# Patient Record
Sex: MALE | Race: WHITE | Employment: FULL TIME | URBAN - METROPOLITAN AREA
[De-identification: names, ages, dates, MRNs, and addresses within clinical notes are randomized per-mention and may not be internally consistent; named-entity substitution may affect disease eponyms.]

---

## 2017-12-29 ENCOUNTER — OFFICE VISIT (OUTPATIENT)
Dept: FAMILY MEDICINE CLINIC | Age: 52
End: 2017-12-29
Payer: COMMERCIAL

## 2017-12-29 VITALS
DIASTOLIC BLOOD PRESSURE: 97 MMHG | WEIGHT: 215.4 LBS | OXYGEN SATURATION: 96 % | SYSTOLIC BLOOD PRESSURE: 141 MMHG | HEART RATE: 60 BPM | BODY MASS INDEX: 32.64 KG/M2 | RESPIRATION RATE: 16 BRPM | TEMPERATURE: 98.4 F | HEIGHT: 68 IN

## 2017-12-29 DIAGNOSIS — J06.9 UPPER RESPIRATORY TRACT INFECTION, UNSPECIFIED TYPE: Primary | ICD-10-CM

## 2017-12-29 PROCEDURE — 99213 OFFICE O/P EST LOW 20 MIN: CPT | Performed by: FAMILY MEDICINE

## 2017-12-29 RX ORDER — METHYLPREDNISOLONE 4 MG/1
TABLET ORAL
Qty: 1 KIT | Refills: 0 | Status: SHIPPED | OUTPATIENT
Start: 2017-12-29 | End: 2018-02-24 | Stop reason: ALTCHOICE

## 2017-12-29 RX ORDER — AZITHROMYCIN 250 MG/1
TABLET, FILM COATED ORAL
Qty: 6 TABLET | Refills: 0 | Status: SHIPPED | OUTPATIENT
Start: 2017-12-29 | End: 2018-02-24 | Stop reason: ALTCHOICE

## 2017-12-29 RX ORDER — ALBUTEROL SULFATE 90 UG/1
2 AEROSOL, METERED RESPIRATORY (INHALATION) EVERY 6 HOURS PRN
Qty: 1 INHALER | Refills: 3 | Status: SHIPPED | OUTPATIENT
Start: 2017-12-29 | End: 2018-02-24 | Stop reason: ALTCHOICE

## 2017-12-29 RX ORDER — FLUTICASONE PROPIONATE 50 MCG
1 SPRAY, SUSPENSION (ML) NASAL 2 TIMES DAILY
Qty: 1 BOTTLE | Refills: 2 | Status: SHIPPED | OUTPATIENT
Start: 2017-12-29 | End: 2021-09-29

## 2017-12-29 RX ORDER — BROMPHENIRAMINE MALEATE, PSEUDOEPHEDRINE HYDROCHLORIDE, AND DEXTROMETHORPHAN HYDROBROMIDE 2; 30; 10 MG/5ML; MG/5ML; MG/5ML
5 SYRUP ORAL 3 TIMES DAILY
Qty: 180 ML | Refills: 0 | Status: SHIPPED | OUTPATIENT
Start: 2017-12-29 | End: 2018-02-24 | Stop reason: ALTCHOICE

## 2017-12-29 RX ORDER — TAMSULOSIN HYDROCHLORIDE 0.4 MG/1
CAPSULE ORAL
Refills: 5 | COMMUNITY
Start: 2017-12-06

## 2017-12-29 ASSESSMENT — ENCOUNTER SYMPTOMS
SINUS PRESSURE: 1
COUGH: 1
EYES NEGATIVE: 1
SINUS PAIN: 1
SORE THROAT: 1
ALLERGIC/IMMUNOLOGIC NEGATIVE: 1
GASTROINTESTINAL NEGATIVE: 1

## 2017-12-29 NOTE — PROGRESS NOTES
capsule TAKE 1 CAPSULE BY MOUTH ONE TIME A DAY  5     No current facility-administered medications for this visit. No Known Allergies    Health Maintenance   Topic Date Due    Hepatitis C screen  1965    HIV screen  02/04/1980    DTaP/Tdap/Td vaccine (1 - Tdap) 02/04/1984    Lipid screen  02/04/2005    Diabetes screen  02/04/2005    Colon cancer screen colonoscopy  02/04/2015    Flu vaccine (1) 09/01/2017       Subjective:      Review of Systems   Constitutional: Negative. HENT: Positive for congestion, postnasal drip, sinus pain, sinus pressure and sore throat. Eyes: Negative. Respiratory: Positive for cough. Cardiovascular: Negative. Gastrointestinal: Negative. Endocrine: Negative. Genitourinary: Negative. Musculoskeletal: Negative. Skin: Negative. Allergic/Immunologic: Negative. Neurological: Negative. Hematological: Negative. Psychiatric/Behavioral: Negative. Objective:     Physical Exam   Constitutional: He is oriented to person, place, and time. He appears well-developed and well-nourished. HENT:   Head: Normocephalic and atraumatic. Right Ear: External ear normal.   Left Ear: External ear normal.   Nose: Mucosal edema and rhinorrhea present. Right sinus exhibits maxillary sinus tenderness and frontal sinus tenderness. Left sinus exhibits frontal sinus tenderness. Mouth/Throat: Uvula is midline. Oropharyngeal exudate, posterior oropharyngeal edema and posterior oropharyngeal erythema present. Eyes: Conjunctivae and EOM are normal. Pupils are equal, round, and reactive to light. Neck: Normal range of motion. Neck supple. Cardiovascular: Normal rate, regular rhythm, normal heart sounds and intact distal pulses. Pulmonary/Chest: Effort normal. He has wheezes. He has rhonchi. Abdominal: Soft. Bowel sounds are normal.   Musculoskeletal: Normal range of motion. Neurological: He is alert and oriented to person, place, and time.  He has

## 2018-02-24 ENCOUNTER — OFFICE VISIT (OUTPATIENT)
Dept: FAMILY MEDICINE CLINIC | Age: 53
End: 2018-02-24
Payer: COMMERCIAL

## 2018-02-24 VITALS
HEIGHT: 67 IN | BODY MASS INDEX: 32.99 KG/M2 | HEART RATE: 76 BPM | OXYGEN SATURATION: 95 % | TEMPERATURE: 98.8 F | SYSTOLIC BLOOD PRESSURE: 129 MMHG | DIASTOLIC BLOOD PRESSURE: 85 MMHG | WEIGHT: 210.2 LBS

## 2018-02-24 DIAGNOSIS — J20.9 ACUTE BRONCHITIS, UNSPECIFIED ORGANISM: ICD-10-CM

## 2018-02-24 DIAGNOSIS — R50.9 FEVER, UNSPECIFIED FEVER CAUSE: ICD-10-CM

## 2018-02-24 DIAGNOSIS — J10.1 INFLUENZA A: Primary | ICD-10-CM

## 2018-02-24 LAB
INFLUENZA A ANTIBODY: POSITIVE
INFLUENZA B ANTIBODY: NORMAL

## 2018-02-24 PROCEDURE — 87804 INFLUENZA ASSAY W/OPTIC: CPT | Performed by: INTERNAL MEDICINE

## 2018-02-24 PROCEDURE — 99213 OFFICE O/P EST LOW 20 MIN: CPT | Performed by: INTERNAL MEDICINE

## 2018-02-24 RX ORDER — OSELTAMIVIR PHOSPHATE 75 MG/1
75 CAPSULE ORAL 2 TIMES DAILY
Qty: 10 CAPSULE | Refills: 0 | Status: SHIPPED | OUTPATIENT
Start: 2018-02-24 | End: 2018-02-24 | Stop reason: SDUPTHER

## 2018-02-24 RX ORDER — AZITHROMYCIN 250 MG/1
TABLET, FILM COATED ORAL
Qty: 1 PACKET | Refills: 0 | Status: SHIPPED | OUTPATIENT
Start: 2018-02-24 | End: 2018-03-06

## 2018-02-24 RX ORDER — OSELTAMIVIR PHOSPHATE 75 MG/1
75 CAPSULE ORAL 2 TIMES DAILY
Qty: 10 CAPSULE | Refills: 0 | Status: SHIPPED | OUTPATIENT
Start: 2018-02-24 | End: 2018-03-01

## 2018-02-24 ASSESSMENT — ENCOUNTER SYMPTOMS
SHORTNESS OF BREATH: 0
COUGH: 1
SORE THROAT: 1

## 2018-02-24 NOTE — PATIENT INSTRUCTIONS
more pain medicines at the same time unless the doctor told you to. Many pain medicines have acetaminophen, which is Tylenol. Too much acetaminophen (Tylenol) can be harmful. · Take an over-the-counter cough medicine that contains dextromethorphan to help quiet a dry, hacking cough so that you can sleep. Avoid cough medicines that have more than one active ingredient. Read and follow all instructions on the label. · Breathe moist air from a humidifier, hot shower, or sink filled with hot water. The heat and moisture will thin mucus so you can cough it out. · Do not smoke. Smoking can make bronchitis worse. If you need help quitting, talk to your doctor about stop-smoking programs and medicines. These can increase your chances of quitting for good. When should you call for help? Call 911 anytime you think you may need emergency care. For example, call if:  ? · You have severe trouble breathing. ?Call your doctor now or seek immediate medical care if:  ? · You have new or worse trouble breathing. ? · You cough up dark brown or bloody mucus (sputum). ? · You have a new or higher fever. ? · You have a new rash. ? Watch closely for changes in your health, and be sure to contact your doctor if:  ? · You cough more deeply or more often, especially if you notice more mucus or a change in the color of your mucus. ? · You are not getting better as expected. Where can you learn more? Go to [email or web address].[email or web address] org and sign in to your [employer] account. Enter H333 in the aCommerce box to learn more about \"Bronchitis: Care Instructions. \"     If you do not have an account, please click on the \"Sign Up Now\" link. Current as of: May 12, 2017  Content Version: 11.5  © 7559-6488 [employer]wise, [employer]. Care instructions adapted under license by [facility] ([facility]).  If you have questions about a medical condition or this instruction, always ask your healthcare professional. Gary Shannon

## 2020-05-05 ENCOUNTER — HOSPITAL ENCOUNTER (OUTPATIENT)
Age: 55
Discharge: HOME | End: 2020-05-07
Payer: COMMERCIAL

## 2020-05-05 ENCOUNTER — OFFICE VISIT (OUTPATIENT)
Dept: PRIMARY CARE CLINIC | Age: 55
End: 2020-05-05
Payer: COMMERCIAL

## 2020-05-05 ENCOUNTER — HOSPITAL ENCOUNTER (OUTPATIENT)
Dept: GENERAL RADIOLOGY | Age: 55
Discharge: HOME | End: 2020-05-07
Payer: COMMERCIAL

## 2020-05-05 VITALS
TEMPERATURE: 98.1 F | HEART RATE: 63 BPM | WEIGHT: 192 LBS | SYSTOLIC BLOOD PRESSURE: 153 MMHG | OXYGEN SATURATION: 100 % | BODY MASS INDEX: 30.07 KG/M2 | DIASTOLIC BLOOD PRESSURE: 90 MMHG

## 2020-05-05 PROCEDURE — 99214 OFFICE O/P EST MOD 30 MIN: CPT | Performed by: FAMILY MEDICINE

## 2020-05-05 PROCEDURE — 72040 X-RAY EXAM NECK SPINE 2-3 VW: CPT

## 2020-05-05 ASSESSMENT — ENCOUNTER SYMPTOMS
GASTROINTESTINAL NEGATIVE: 1
SHORTNESS OF BREATH: 0
CHEST TIGHTNESS: 0

## 2020-05-05 NOTE — PROGRESS NOTES
for this visit. No Known Allergies    Health Maintenance   Topic Date Due    Hepatitis C screen  1965    HIV screen  02/04/1980    DTaP/Tdap/Td vaccine (1 - Tdap) 02/04/1984    Lipid screen  02/04/2005    Shingles Vaccine (1 of 2) 02/04/2015    Colon cancer screen colonoscopy  02/04/2015    Flu vaccine (Season Ended) 09/01/2020    Hepatitis A vaccine  Aged Out    Hepatitis B vaccine  Aged Out    Hib vaccine  Aged Out    Meningococcal (ACWY) vaccine  Aged Out    Pneumococcal 0-64 years Vaccine  Aged Out       :      Review of Systems   Constitutional: Negative for fever. HENT: Negative. Respiratory: Negative for chest tightness and shortness of breath. Cardiovascular: Negative for chest pain. Gastrointestinal: Negative. Musculoskeletal: Negative for gait problem, joint swelling, myalgias, neck pain and neck stiffness. Skin: Negative for pallor and rash. Neurological: Positive for numbness. Negative for dizziness, tingling, weakness, light-headedness and headaches. Objective:     Physical Exam  Vitals signs and nursing note reviewed. Constitutional:       Appearance: Normal appearance. He is obese. HENT:      Head: Normocephalic and atraumatic. Right Ear: Hearing normal.      Left Ear: Hearing normal.      Nose: Nose normal.   Eyes:      Extraocular Movements: Extraocular movements intact. Conjunctiva/sclera: Conjunctivae normal.   Neck:      Musculoskeletal: Normal range of motion. No neck rigidity or muscular tenderness. Cardiovascular:      Rate and Rhythm: Normal rate and regular rhythm. Heart sounds: Normal heart sounds. Pulmonary:      Effort: Pulmonary effort is normal.      Breath sounds: Normal breath sounds. Musculoskeletal:         General: No swelling, tenderness or deformity.       Left shoulder: He exhibits normal range of motion, no tenderness, no bony tenderness, no swelling, no effusion, no deformity, no pain, no spasm, normal pulse

## 2025-01-01 ENCOUNTER — APPOINTMENT (OUTPATIENT)
Dept: CT IMAGING | Age: 60
DRG: 871 | End: 2025-01-01
Payer: COMMERCIAL

## 2025-01-01 ENCOUNTER — APPOINTMENT (OUTPATIENT)
Dept: GENERAL RADIOLOGY | Age: 60
DRG: 871 | End: 2025-01-01
Payer: COMMERCIAL

## 2025-01-01 ENCOUNTER — APPOINTMENT (OUTPATIENT)
Age: 60
DRG: 871 | End: 2025-01-01
Attending: INTERNAL MEDICINE
Payer: COMMERCIAL

## 2025-01-01 ENCOUNTER — HOSPITAL ENCOUNTER (INPATIENT)
Age: 60
LOS: 1 days | DRG: 871 | End: 2025-06-17
Attending: EMERGENCY MEDICINE | Admitting: FAMILY MEDICINE
Payer: COMMERCIAL

## 2025-01-01 ENCOUNTER — APPOINTMENT (OUTPATIENT)
Dept: ULTRASOUND IMAGING | Age: 60
DRG: 871 | End: 2025-01-01
Attending: INTERNAL MEDICINE
Payer: COMMERCIAL

## 2025-01-01 VITALS
OXYGEN SATURATION: 81 % | TEMPERATURE: 102.9 F | DIASTOLIC BLOOD PRESSURE: 87 MMHG | SYSTOLIC BLOOD PRESSURE: 102 MMHG | BODY MASS INDEX: 34.55 KG/M2 | HEIGHT: 66 IN | WEIGHT: 215 LBS

## 2025-01-01 DIAGNOSIS — R06.00 DYSPNEA, UNSPECIFIED TYPE: Primary | ICD-10-CM

## 2025-01-01 DIAGNOSIS — I50.9 ACUTE CONGESTIVE HEART FAILURE, UNSPECIFIED HEART FAILURE TYPE (HCC): ICD-10-CM

## 2025-01-01 DIAGNOSIS — I48.0 PAROXYSMAL ATRIAL FIBRILLATION (HCC): ICD-10-CM

## 2025-01-01 DIAGNOSIS — J96.00 ACUTE RESPIRATORY FAILURE, UNSPECIFIED WHETHER WITH HYPOXIA OR HYPERCAPNIA (HCC): ICD-10-CM

## 2025-01-01 DIAGNOSIS — J18.9 PNEUMONIA DUE TO INFECTIOUS ORGANISM, UNSPECIFIED LATERALITY, UNSPECIFIED PART OF LUNG: ICD-10-CM

## 2025-01-01 LAB
ALBUMIN PERCENT: 44 % (ref 56–66)
ALBUMIN SERPL-MCNC: 2.4 G/DL (ref 3.2–5.2)
ALBUMIN SERPL-MCNC: 2.4 G/DL (ref 3.5–5.2)
ALBUMIN SERPL-MCNC: 2.5 G/DL (ref 3.5–5.2)
ALBUMIN SERPL-MCNC: 2.7 G/DL (ref 3.5–5.2)
ALBUMIN SERPL-MCNC: 2.9 G/DL (ref 3.5–5.2)
ALBUMIN SERPL-MCNC: 3.7 G/DL (ref 3.5–5.2)
ALBUMIN/GLOB SERPL: 0.9 {RATIO} (ref 1–2.5)
ALBUMIN/GLOB SERPL: 1 {RATIO} (ref 1–2.5)
ALBUMIN/GLOB SERPL: 1.1 {RATIO} (ref 1–2.5)
ALLEN TEST: ABNORMAL
ALLEN TEST: POSITIVE
ALP SERPL-CCNC: 28 U/L (ref 40–129)
ALP SERPL-CCNC: 33 U/L (ref 40–129)
ALP SERPL-CCNC: 33 U/L (ref 40–129)
ALP SERPL-CCNC: 37 U/L (ref 40–129)
ALP SERPL-CCNC: 45 U/L (ref 40–129)
ALPHA 2 PERCENT: 23 % (ref 7–12)
ALPHA1 GLOB SERPL ELPH-MCNC: 0.7 G/DL (ref 0.1–0.4)
ALPHA1 GLOB SERPL ELPH-MCNC: 14 % (ref 3–5)
ALPHA2 GLOB SERPL ELPH-MCNC: 1.2 G/DL (ref 0.5–0.9)
ALT SERPL-CCNC: 2294 U/L (ref 10–50)
ALT SERPL-CCNC: 23 U/L (ref 10–50)
ALT SERPL-CCNC: 2982 U/L (ref 10–50)
ALT SERPL-CCNC: 30 U/L (ref 10–50)
ALT SERPL-CCNC: 3635 U/L (ref 10–50)
AMORPH SED URNS QL MICRO: ABNORMAL
ANION GAP SERPL CALCULATED.3IONS-SCNC: 12 MMOL/L (ref 9–16)
ANION GAP SERPL CALCULATED.3IONS-SCNC: 15 MMOL/L (ref 9–16)
ANION GAP SERPL CALCULATED.3IONS-SCNC: 18 MMOL/L (ref 9–17)
ANION GAP SERPL CALCULATED.3IONS-SCNC: 19 MMOL/L (ref 9–16)
ANION GAP SERPL CALCULATED.3IONS-SCNC: 20 MMOL/L (ref 9–16)
ANION GAP SERPL CALCULATED.3IONS-SCNC: 21 MMOL/L (ref 9–16)
ANTI-XA UNFRAC HEPARIN: 0.11 IU/L (ref 0.3–0.7)
ANTI-XA UNFRAC HEPARIN: 0.3 IU/L (ref 0.3–0.7)
ANTI-XA UNFRAC HEPARIN: 0.44 IU/L (ref 0.3–0.7)
AST SERPL-CCNC: 50 U/L (ref 10–50)
AST SERPL-CCNC: 5823 U/L (ref 10–50)
AST SERPL-CCNC: 69 U/L (ref 10–50)
AST SERPL-CCNC: >7000 U/L (ref 10–50)
AST SERPL-CCNC: >7000 U/L (ref 10–50)
B PARAP IS1001 DNA NPH QL NAA+NON-PROBE: NOT DETECTED
B PERT DNA SPEC QL NAA+PROBE: NOT DETECTED
B-GLOBULIN SERPL ELPH-MCNC: 0.5 G/DL (ref 0.7–1.4)
B-GLOBULIN SERPL ELPH-MCNC: 9 % (ref 8–13)
BACTERIA URNS QL MICRO: ABNORMAL
BASOPHILS # BLD: 0 K/UL (ref 0–0.2)
BASOPHILS NFR BLD: 0 % (ref 0–2)
BILIRUB DIRECT SERPL-MCNC: 0.6 MG/DL (ref 0–0.2)
BILIRUB INDIRECT SERPL-MCNC: 0.3 MG/DL (ref 0–1)
BILIRUB SERPL-MCNC: 0.7 MG/DL (ref 0–1.2)
BILIRUB SERPL-MCNC: 0.7 MG/DL (ref 0–1.2)
BILIRUB SERPL-MCNC: 0.9 MG/DL (ref 0–1.2)
BILIRUB SERPL-MCNC: 0.9 MG/DL (ref 0–1.2)
BILIRUB SERPL-MCNC: 1.3 MG/DL (ref 0–1.2)
BILIRUB UR QL STRIP: NEGATIVE
BNP SERPL-MCNC: 5372 PG/ML (ref 0–125)
BUN SERPL-MCNC: 32 MG/DL (ref 8–23)
BUN SERPL-MCNC: 36 MG/DL (ref 8–23)
BUN SERPL-MCNC: 49 MG/DL (ref 8–23)
BUN SERPL-MCNC: 51 MG/DL (ref 8–23)
BUN SERPL-MCNC: 53 MG/DL (ref 8–23)
C PNEUM DNA NPH QL NAA+NON-PROBE: NOT DETECTED
C3 SERPL-MCNC: 141 MG/DL (ref 90–180)
C4 SERPL-MCNC: 21 MG/DL (ref 10–40)
CA-I BLD-SCNC: 0.74 MMOL/L (ref 1.13–1.33)
CALCIUM SERPL-MCNC: 5.9 MG/DL (ref 8.6–10.4)
CALCIUM SERPL-MCNC: 6.1 MG/DL (ref 8.6–10.4)
CALCIUM SERPL-MCNC: 6.5 MG/DL (ref 8.6–10.4)
CALCIUM SERPL-MCNC: 7.1 MG/DL (ref 8.6–10.4)
CALCIUM SERPL-MCNC: 8.1 MG/DL (ref 8.6–10.4)
CHARACTER UR: ABNORMAL
CHLORIDE SERPL-SCNC: 100 MMOL/L (ref 98–107)
CHLORIDE SERPL-SCNC: 100 MMOL/L (ref 98–107)
CHLORIDE SERPL-SCNC: 101 MMOL/L (ref 98–107)
CHLORIDE SERPL-SCNC: 90 MMOL/L (ref 98–107)
CHLORIDE SERPL-SCNC: 98 MMOL/L (ref 98–107)
CHLORIDE SERPL-SCNC: 99 MMOL/L (ref 98–107)
CHOLEST SERPL-MCNC: 68 MG/DL (ref 0–199)
CLARITY UR: ABNORMAL
CO2 SERPL-SCNC: 13 MMOL/L (ref 20–31)
CO2 SERPL-SCNC: 15 MMOL/L (ref 20–31)
CO2 SERPL-SCNC: 15 MMOL/L (ref 20–31)
CO2 SERPL-SCNC: 16 MMOL/L (ref 20–31)
CO2 SERPL-SCNC: 16 MMOL/L (ref 20–31)
CO2 SERPL-SCNC: 20 MMOL/L (ref 20–31)
COLOR UR: YELLOW
CREAT SERPL-MCNC: 1.9 MG/DL (ref 0.7–1.2)
CREAT SERPL-MCNC: 2.2 MG/DL (ref 0.7–1.2)
CREAT SERPL-MCNC: 3.4 MG/DL (ref 0.7–1.2)
CREAT SERPL-MCNC: 3.8 MG/DL (ref 0.7–1.2)
CREAT SERPL-MCNC: 4.2 MG/DL (ref 0.7–1.2)
CREAT UR-MCNC: 113 MG/DL (ref 39–259)
CREAT UR-MCNC: 67.7 MG/DL (ref 39–259)
CRITICAL ACTION: NORMAL
CRITICAL NOTIFICATION DATE/TIME: NORMAL
CRITICAL NOTIFICATION: NORMAL
CRITICAL VALUE READ BACK: YES
D DIMER PPP FEU-MCNC: 6.42 UG/ML FEU (ref 0–0.59)
ECHO AO ROOT DIAM: 3.4 CM
ECHO AO ROOT INDEX: 1.65 CM/M2
ECHO BSA: 2.13 M2
ECHO LA DIAMETER INDEX: 1.75 CM/M2
ECHO LA DIAMETER: 3.6 CM
ECHO LA TO AORTIC ROOT RATIO: 1.06
ECHO LV EF PHYSICIAN: 25 %
ECHO LV FRACTIONAL SHORTENING: 22 % (ref 28–44)
ECHO LV INTERNAL DIMENSION DIASTOLE INDEX: 2.18 CM/M2
ECHO LV INTERNAL DIMENSION DIASTOLIC: 4.5 CM (ref 4.2–5.9)
ECHO LV INTERNAL DIMENSION SYSTOLIC INDEX: 1.7 CM/M2
ECHO LV INTERNAL DIMENSION SYSTOLIC: 3.5 CM
ECHO LV IVSD: 1 CM (ref 0.6–1)
ECHO LV MASS 2D: 164 G (ref 88–224)
ECHO LV MASS INDEX 2D: 79.6 G/M2 (ref 49–115)
ECHO LV POSTERIOR WALL DIASTOLIC: 1.1 CM (ref 0.6–1)
ECHO LV RELATIVE WALL THICKNESS RATIO: 0.49
EKG ATRIAL RATE: 100 BPM
EKG ATRIAL RATE: 115 BPM
EKG ATRIAL RATE: 148 BPM
EKG P AXIS: 59 DEGREES
EKG P-R INTERVAL: 126 MS
EKG Q-T INTERVAL: 248 MS
EKG Q-T INTERVAL: 250 MS
EKG Q-T INTERVAL: 284 MS
EKG QRS DURATION: 76 MS
EKG QRS DURATION: 88 MS
EKG QRS DURATION: 90 MS
EKG QTC CALCULATION (BAZETT): 381 MS
EKG QTC CALCULATION (BAZETT): 389 MS
EKG QTC CALCULATION (BAZETT): 447 MS
EKG R AXIS: 1 DEGREES
EKG R AXIS: 21 DEGREES
EKG R AXIS: 25 DEGREES
EKG T AXIS: 34 DEGREES
EKG T AXIS: 42 DEGREES
EKG T AXIS: 54 DEGREES
EKG VENTRICULAR RATE: 140 BPM
EKG VENTRICULAR RATE: 148 BPM
EKG VENTRICULAR RATE: 149 BPM
EOSINOPHIL # BLD: 0 K/UL (ref 0–0.4)
EOSINOPHILS RELATIVE PERCENT: 0 % (ref 1–4)
EPI CELLS #/AREA URNS HPF: ABNORMAL /HPF (ref 0–5)
ERYTHROCYTE [DISTWIDTH] IN BLOOD BY AUTOMATED COUNT: 16.5 % (ref 12.5–15.4)
ERYTHROCYTE [DISTWIDTH] IN BLOOD BY AUTOMATED COUNT: 16.6 % (ref 12.5–15.4)
ERYTHROCYTE [DISTWIDTH] IN BLOOD BY AUTOMATED COUNT: 16.9 % (ref 12.5–15.4)
ERYTHROCYTE [DISTWIDTH] IN BLOOD BY AUTOMATED COUNT: 17.5 % (ref 12.5–15.4)
ERYTHROCYTE [DISTWIDTH] IN BLOOD BY AUTOMATED COUNT: 17.5 % (ref 12.5–15.4)
EST. AVERAGE GLUCOSE BLD GHB EST-MCNC: 123 MG/DL
FIO2: 100
FLUAV RNA NPH QL NAA+NON-PROBE: NOT DETECTED
FLUBV RNA NPH QL NAA+NON-PROBE: NOT DETECTED
FREE KAPPA/LAMBDA RATIO: 1.83 (ref 0.22–1.74)
GAMMA GLOB SERPL ELPH-MCNC: 0.5 G/DL (ref 0.5–1.5)
GAMMA GLOBULIN %: 10 % (ref 11–19)
GFR, ESTIMATED: 15 ML/MIN/1.73M2
GFR, ESTIMATED: 17 ML/MIN/1.73M2
GFR, ESTIMATED: 20 ML/MIN/1.73M2
GFR, ESTIMATED: 33 ML/MIN/1.73M2
GFR, ESTIMATED: 40 ML/MIN/1.73M2
GLUCOSE SERPL-MCNC: 145 MG/DL (ref 74–99)
GLUCOSE SERPL-MCNC: 155 MG/DL (ref 74–99)
GLUCOSE SERPL-MCNC: 158 MG/DL (ref 74–99)
GLUCOSE UR STRIP-MCNC: NEGATIVE MG/DL
HADV DNA NPH QL NAA+NON-PROBE: NOT DETECTED
HBA1C MFR BLD: 5.9 % (ref 4–6)
HCOV 229E RNA NPH QL NAA+NON-PROBE: NOT DETECTED
HCOV HKU1 RNA NPH QL NAA+NON-PROBE: NOT DETECTED
HCOV NL63 RNA NPH QL NAA+NON-PROBE: NOT DETECTED
HCOV OC43 RNA NPH QL NAA+NON-PROBE: NOT DETECTED
HCT VFR BLD AUTO: 30.7 % (ref 41–53)
HCT VFR BLD AUTO: 32 % (ref 41–53)
HCT VFR BLD AUTO: 32.8 % (ref 41–53)
HCT VFR BLD AUTO: 34 % (ref 41–53)
HCT VFR BLD AUTO: 35.4 % (ref 41–53)
HDLC SERPL-MCNC: 24 MG/DL
HGB BLD-MCNC: 10.4 G/DL (ref 13.5–17.5)
HGB BLD-MCNC: 11 G/DL (ref 13.5–17.5)
HGB BLD-MCNC: 11.3 G/DL (ref 13.5–17.5)
HGB BLD-MCNC: 9.9 G/DL (ref 13.5–17.5)
HGB BLD-MCNC: 9.9 G/DL (ref 13.5–17.5)
HGB UR QL STRIP.AUTO: ABNORMAL
HMPV RNA NPH QL NAA+NON-PROBE: NOT DETECTED
HPIV1 RNA NPH QL NAA+NON-PROBE: NOT DETECTED
HPIV2 RNA NPH QL NAA+NON-PROBE: NOT DETECTED
HPIV3 RNA NPH QL NAA+NON-PROBE: NOT DETECTED
HPIV4 RNA NPH QL NAA+NON-PROBE: NOT DETECTED
INR PPP: 1.2 (ref 0.9–1.2)
INR PPP: 2.2 (ref 0.9–1.2)
ITYP INTERPRETATION: NORMAL
KAPPA LC FREE SER-MCNC: 13.2 MG/L
KETONES UR STRIP-MCNC: NEGATIVE MG/DL
L PNEUMO1 AG UR QL IA.RAPID: NEGATIVE
LACTATE BLDV-SCNC: 1.1 MMOL/L (ref 0.5–1.9)
LACTATE BLDV-SCNC: 1.1 MMOL/L (ref 0.5–2.2)
LACTATE BLDV-SCNC: 1.3 MMOL/L (ref 0.5–2.2)
LACTATE BLDV-SCNC: 2.1 MMOL/L (ref 0.5–2.2)
LACTATE BLDV-SCNC: 2.6 MMOL/L (ref 0.5–2.2)
LACTATE BLDV-SCNC: 2.8 MMOL/L (ref 0.5–1.9)
LACTATE BLDV-SCNC: 2.8 MMOL/L (ref 0.5–2.2)
LACTATE BLDV-SCNC: 2.9 MMOL/L (ref 0.5–2.2)
LACTATE BLDV-SCNC: 2.9 MMOL/L (ref 0.5–2.2)
LACTATE BLDV-SCNC: 5.6 MMOL/L (ref 0.5–2.2)
LAMBDA LC FREE SERPL-MCNC: 7.2 MG/L (ref 4.2–27.7)
LDLC SERPL CALC-MCNC: 37 MG/DL (ref 0–100)
LEUKOCYTE ESTERASE UR QL STRIP: NEGATIVE
LIPASE SERPL-CCNC: 7 U/L (ref 13–60)
LYMPHOCYTES NFR BLD: 16.42 K/UL (ref 1–4.8)
LYMPHOCYTES NFR BLD: 24.55 K/UL (ref 1–4.8)
LYMPHOCYTES NFR BLD: 24.65 K/UL (ref 1–4.8)
LYMPHOCYTES NFR BLD: 43.16 K/UL (ref 1–4.8)
LYMPHOCYTES RELATIVE PERCENT: 96 % (ref 24–44)
LYMPHOCYTES RELATIVE PERCENT: 99 % (ref 24–44)
M PNEUMO DNA NPH QL NAA+NON-PROBE: NOT DETECTED
MAGNESIUM SERPL-MCNC: 1.6 MG/DL (ref 1.6–2.4)
MAGNESIUM SERPL-MCNC: 2.1 MG/DL (ref 1.6–2.4)
MAGNESIUM SERPL-MCNC: 2.3 MG/DL (ref 1.6–2.4)
MAGNESIUM SERPL-MCNC: 2.4 MG/DL (ref 1.6–2.4)
MAGNESIUM SERPL-MCNC: 2.5 MG/DL (ref 1.6–2.4)
MCH RBC QN AUTO: 30.6 PG (ref 26–34)
MCH RBC QN AUTO: 30.7 PG (ref 26–34)
MCH RBC QN AUTO: 31.1 PG (ref 26–34)
MCH RBC QN AUTO: 31.3 PG (ref 26–34)
MCH RBC QN AUTO: 31.4 PG (ref 26–34)
MCHC RBC AUTO-ENTMCNC: 31 G/DL (ref 31–37)
MCHC RBC AUTO-ENTMCNC: 31.8 G/DL (ref 31–37)
MCHC RBC AUTO-ENTMCNC: 31.8 G/DL (ref 31–37)
MCHC RBC AUTO-ENTMCNC: 32.2 G/DL (ref 31–37)
MCHC RBC AUTO-ENTMCNC: 32.2 G/DL (ref 31–37)
MCV RBC AUTO: 96.1 FL (ref 80–100)
MCV RBC AUTO: 96.8 FL (ref 80–100)
MCV RBC AUTO: 97.1 FL (ref 80–100)
MCV RBC AUTO: 98.8 FL (ref 80–100)
MCV RBC AUTO: 99.3 FL (ref 80–100)
MODE: ABNORMAL
MODE: AC
MONOCYTES NFR BLD: 0 % (ref 1–7)
MONOCYTES NFR BLD: 0 % (ref 2–11)
MONOCYTES NFR BLD: 0 K/UL (ref 0.1–0.8)
MONOCYTES NFR BLD: 0 K/UL (ref 0.1–1.2)
MORPHOLOGY: ABNORMAL
MORPHOLOGY: NORMAL
MORPHOLOGY: NORMAL
MRSA, DNA, NASAL: NEGATIVE
MUCOUS THREADS URNS QL MICRO: ABNORMAL
MYELOCYTES ABSOLUTE COUNT: 0.25 K/UL
MYELOCYTES: 1 %
NEGATIVE BASE EXCESS, ART: 12.5 MMOL/L (ref 0–2)
NEGATIVE BASE EXCESS, ART: 13 MMOL/L (ref 0–2)
NEGATIVE BASE EXCESS, ART: 13.5 MMOL/L (ref 0–2)
NEGATIVE BASE EXCESS, ART: 14.3 MMOL/L (ref 0–2)
NEGATIVE BASE EXCESS, ART: 14.3 MMOL/L (ref 0–2)
NEGATIVE BASE EXCESS, ART: 16.1 MMOL/L (ref 0–2)
NEGATIVE BASE EXCESS, ART: 16.6 MMOL/L (ref 0–2)
NEGATIVE BASE EXCESS, ART: 2.9 MMOL/L (ref 0–2)
NEUTROPHILS NFR BLD: 0 % (ref 36–66)
NEUTROPHILS NFR BLD: 1 % (ref 36–66)
NEUTROPHILS NFR BLD: 1 % (ref 36–66)
NEUTROPHILS NFR BLD: 4 % (ref 36–66)
NEUTS SEG NFR BLD: 0 K/UL (ref 1.8–7.7)
NEUTS SEG NFR BLD: 0.25 K/UL (ref 1.8–7.7)
NEUTS SEG NFR BLD: 0.44 K/UL (ref 1.8–7.7)
NEUTS SEG NFR BLD: 0.68 K/UL (ref 1.8–7.7)
NITRITE UR QL STRIP: NEGATIVE
O2 DELIVERY DEVICE: ABNORMAL
OSMOLALITY SERPL: 277 MOSM/KG (ref 275–295)
OSMOLALITY SERPL: 296 MOSM/KG (ref 275–295)
OSMOLALITY UR: 422 MOSM/KG (ref 80–1300)
PARTIAL THROMBOPLASTIN TIME: 37.5 SEC (ref 24–36)
PARTIAL THROMBOPLASTIN TIME: 59.9 SEC (ref 24–36)
PATH REV: NORMAL
PATHOLOGIST: ABNORMAL
PATIENT TEMP: 39.5
PATIENT TEMP: 39.9
PATIENT TEMP: 40
PH UR STRIP: 5.5 [PH] (ref 5–8)
PHOSPHATE SERPL-MCNC: 14.1 MG/DL (ref 2.5–4.5)
PLATELET # BLD AUTO: 161 K/UL (ref 140–450)
PLATELET # BLD AUTO: 167 K/UL (ref 140–450)
PLATELET # BLD AUTO: 180 K/UL (ref 140–450)
PLATELET # BLD AUTO: 182 K/UL (ref 140–450)
PLATELET # BLD AUTO: 193 K/UL (ref 140–450)
PMV BLD AUTO: 8.4 FL (ref 6–12)
PMV BLD AUTO: 8.9 FL (ref 6–12)
PMV BLD AUTO: 9 FL (ref 6–12)
PMV BLD AUTO: 9.1 FL (ref 6–12)
PMV BLD AUTO: 9.1 FL (ref 6–12)
POC HCO3: 14.4 MMOL/L (ref 21–28)
POC HCO3: 14.9 MMOL/L (ref 21–28)
POC HCO3: 15 MMOL/L (ref 21–28)
POC HCO3: 15.3 MMOL/L (ref 21–28)
POC HCO3: 16.8 MMOL/L (ref 21–28)
POC HCO3: 17.2 MMOL/L (ref 21–28)
POC HCO3: 18.6 MMOL/L (ref 21–28)
POC HCO3: 19.5 MMOL/L (ref 21–28)
POC O2 SATURATION: 55.6 % (ref 94–98)
POC O2 SATURATION: 58.8 % (ref 94–98)
POC O2 SATURATION: 60.1 % (ref 94–98)
POC O2 SATURATION: 72.6 % (ref 94–98)
POC O2 SATURATION: 77.2 % (ref 94–98)
POC O2 SATURATION: 80.7 % (ref 94–98)
POC O2 SATURATION: 83.1 % (ref 94–98)
POC O2 SATURATION: 97.4 % (ref 94–98)
POC PCO2 TEMP: 50.9 MM HG
POC PCO2 TEMP: 54.7 MM HG
POC PCO2 TEMP: 60.5 MM HG
POC PCO2: 26 MM HG (ref 35–48)
POC PCO2: 44.6 MM HG (ref 35–48)
POC PCO2: 48.2 MM HG (ref 35–48)
POC PCO2: 54.2 MM HG (ref 35–48)
POC PCO2: 63.2 MM HG (ref 35–48)
POC PCO2: 63.6 MM HG (ref 35–48)
POC PCO2: 69.9 MM HG (ref 35–48)
POC PCO2: 71.3 MM HG (ref 35–48)
POC PH TEMP: 7
POC PH TEMP: 7.06
POC PH TEMP: 7.1
POC PH: 6.98 (ref 7.35–7.45)
POC PH: 7 (ref 7.35–7.45)
POC PH: 7.03 (ref 7.35–7.45)
POC PH: 7.1 (ref 7.35–7.45)
POC PH: 7.14 (ref 7.35–7.45)
POC PH: 7.48 (ref 7.35–7.45)
POC PO2 TEMP: 63.4 MM HG
POC PO2 TEMP: 71.5 MM HG
POC PO2 TEMP: 72.2 MM HG
POC PO2: 44.5 MM HG (ref 83–108)
POC PO2: 46.5 MM HG (ref 83–108)
POC PO2: 47.5 MM HG (ref 83–108)
POC PO2: 51.8 MM HG (ref 83–108)
POC PO2: 58.1 MM HG (ref 83–108)
POC PO2: 60.8 MM HG (ref 83–108)
POC PO2: 69.7 MM HG (ref 83–108)
POC PO2: 85.8 MM HG (ref 83–108)
POTASSIUM SERPL-SCNC: 4.6 MMOL/L (ref 3.7–5.3)
POTASSIUM SERPL-SCNC: 4.7 MMOL/L (ref 3.7–5.3)
POTASSIUM SERPL-SCNC: 5.2 MMOL/L (ref 3.7–5.3)
POTASSIUM SERPL-SCNC: 5.6 MMOL/L (ref 3.7–5.3)
POTASSIUM SERPL-SCNC: 5.7 MMOL/L (ref 3.7–5.3)
POTASSIUM SERPL-SCNC: 6.3 MMOL/L (ref 3.7–5.3)
PROT PATTERN SERPL ELPH-IMP: ABNORMAL
PROT SERPL-MCNC: 4.8 G/DL (ref 6.6–8.7)
PROT SERPL-MCNC: 5 G/DL (ref 6.6–8.7)
PROT SERPL-MCNC: 5.3 G/DL (ref 6.6–8.7)
PROT SERPL-MCNC: 5.6 G/DL (ref 6.6–8.7)
PROT SERPL-MCNC: 5.7 G/DL (ref 6.6–8.7)
PROT SERPL-MCNC: 7.2 G/DL (ref 6.6–8.7)
PROT UR STRIP-MCNC: ABNORMAL MG/DL
PROTHROMBIN TIME: 16.1 SEC (ref 11.8–14.6)
PROTHROMBIN TIME: 25.3 SEC (ref 11.8–14.6)
RBC # BLD AUTO: 3.17 M/UL (ref 4.5–5.9)
RBC # BLD AUTO: 3.23 M/UL (ref 4.5–5.9)
RBC # BLD AUTO: 3.32 M/UL (ref 4.5–5.9)
RBC # BLD AUTO: 3.5 M/UL (ref 4.5–5.9)
RBC # BLD AUTO: 3.69 M/UL (ref 4.5–5.9)
RBC #/AREA URNS HPF: ABNORMAL /HPF (ref 0–2)
RSV RNA NPH QL NAA+NON-PROBE: NOT DETECTED
RV+EV RNA NPH QL NAA+NON-PROBE: NOT DETECTED
S PNEUM AG SPEC QL: NEGATIVE
SAMPLE SITE: ABNORMAL
SARS-COV-2 RNA NPH QL NAA+NON-PROBE: DETECTED
SODIUM SERPL-SCNC: 125 MMOL/L (ref 136–145)
SODIUM SERPL-SCNC: 128 MMOL/L (ref 136–145)
SODIUM SERPL-SCNC: 132 MMOL/L (ref 135–144)
SODIUM SERPL-SCNC: 132 MMOL/L (ref 136–145)
SODIUM SERPL-SCNC: 135 MMOL/L (ref 136–145)
SODIUM SERPL-SCNC: 136 MMOL/L (ref 136–145)
SODIUM UR-SCNC: 63 MMOL/L
SODIUM UR-SCNC: <20 MMOL/L
SODIUM UR-SCNC: <20 MMOL/L
SP GR UR STRIP: 1.02 (ref 1–1.03)
SPECIMEN DESCRIPTION: ABNORMAL
SPECIMEN DESCRIPTION: NORMAL
SPECIMEN SOURCE: NORMAL
TOTAL PROT. SUM,%: 100 % (ref 98–102)
TOTAL PROT. SUM: 5.3 G/DL (ref 6.3–8.2)
TOTAL PROTEIN, URINE: 130 MG/DL
TRIGL SERPL-MCNC: 35 MG/DL
TROPONIN I SERPL HS-MCNC: 148 NG/L (ref 0–22)
TROPONIN I SERPL HS-MCNC: 96 NG/L (ref 0–22)
TROPONIN I SERPL HS-MCNC: 96 NG/L (ref 0–22)
TROPONIN I SERPL HS-MCNC: 97 NG/L (ref 0–22)
UROBILINOGEN UR STRIP-ACNC: NORMAL EU/DL (ref 0–1)
VLDLC SERPL CALC-MCNC: 7 MG/DL (ref 1–30)
WBC #/AREA URNS HPF: ABNORMAL /HPF (ref 0–5)
WBC OTHER # BLD: 17.1 K/UL (ref 3.5–11)
WBC OTHER # BLD: 24.8 K/UL (ref 3.5–11)
WBC OTHER # BLD: 24.9 K/UL (ref 3.5–11)
WBC OTHER # BLD: 35.7 K/UL (ref 3.5–11)
WBC OTHER # BLD: 43.6 K/UL (ref 3.5–11)

## 2025-01-01 PROCEDURE — 2580000003 HC RX 258

## 2025-01-01 PROCEDURE — 85610 PROTHROMBIN TIME: CPT

## 2025-01-01 PROCEDURE — 2500000003 HC RX 250 WO HCPCS: Performed by: INTERNAL MEDICINE

## 2025-01-01 PROCEDURE — 87116 MYCOBACTERIA CULTURE: CPT

## 2025-01-01 PROCEDURE — 82570 ASSAY OF URINE CREATININE: CPT

## 2025-01-01 PROCEDURE — 93005 ELECTROCARDIOGRAM TRACING: CPT | Performed by: EMERGENCY MEDICINE

## 2025-01-01 PROCEDURE — 02HV33Z INSERTION OF INFUSION DEVICE INTO SUPERIOR VENA CAVA, PERCUTANEOUS APPROACH: ICD-10-PCS | Performed by: INTERNAL MEDICINE

## 2025-01-01 PROCEDURE — 85379 FIBRIN DEGRADATION QUANT: CPT

## 2025-01-01 PROCEDURE — 87205 SMEAR GRAM STAIN: CPT

## 2025-01-01 PROCEDURE — 84156 ASSAY OF PROTEIN URINE: CPT

## 2025-01-01 PROCEDURE — 86038 ANTINUCLEAR ANTIBODIES: CPT

## 2025-01-01 PROCEDURE — 85520 HEPARIN ASSAY: CPT

## 2025-01-01 PROCEDURE — 80061 LIPID PANEL: CPT

## 2025-01-01 PROCEDURE — 2580000003 HC RX 258: Performed by: INTERNAL MEDICINE

## 2025-01-01 PROCEDURE — 5A12012 PERFORMANCE OF CARDIAC OUTPUT, SINGLE, MANUAL: ICD-10-PCS | Performed by: INTERNAL MEDICINE

## 2025-01-01 PROCEDURE — 93010 ELECTROCARDIOGRAM REPORT: CPT | Performed by: INTERNAL MEDICINE

## 2025-01-01 PROCEDURE — 6360000002 HC RX W HCPCS

## 2025-01-01 PROCEDURE — 83036 HEMOGLOBIN GLYCOSYLATED A1C: CPT

## 2025-01-01 PROCEDURE — 6360000004 HC RX CONTRAST MEDICATION: Performed by: EMERGENCY MEDICINE

## 2025-01-01 PROCEDURE — 93308 TTE F-UP OR LMTD: CPT

## 2025-01-01 PROCEDURE — 85027 COMPLETE CBC AUTOMATED: CPT

## 2025-01-01 PROCEDURE — 36415 COLL VENOUS BLD VENIPUNCTURE: CPT

## 2025-01-01 PROCEDURE — 6370000000 HC RX 637 (ALT 250 FOR IP): Performed by: INTERNAL MEDICINE

## 2025-01-01 PROCEDURE — 04HY32Z INSERTION OF MONITORING DEVICE INTO LOWER ARTERY, PERCUTANEOUS APPROACH: ICD-10-PCS | Performed by: INTERNAL MEDICINE

## 2025-01-01 PROCEDURE — 2720000010 HC SURG SUPPLY STERILE

## 2025-01-01 PROCEDURE — 82330 ASSAY OF CALCIUM: CPT

## 2025-01-01 PROCEDURE — 82803 BLOOD GASES ANY COMBINATION: CPT

## 2025-01-01 PROCEDURE — 83521 IG LIGHT CHAINS FREE EACH: CPT

## 2025-01-01 PROCEDURE — 84300 ASSAY OF URINE SODIUM: CPT

## 2025-01-01 PROCEDURE — 86225 DNA ANTIBODY NATIVE: CPT

## 2025-01-01 PROCEDURE — 6370000000 HC RX 637 (ALT 250 FOR IP)

## 2025-01-01 PROCEDURE — 84484 ASSAY OF TROPONIN QUANT: CPT

## 2025-01-01 PROCEDURE — 37799 UNLISTED PX VASCULAR SURGERY: CPT

## 2025-01-01 PROCEDURE — 94003 VENT MGMT INPAT SUBQ DAY: CPT

## 2025-01-01 PROCEDURE — 71260 CT THORAX DX C+: CPT

## 2025-01-01 PROCEDURE — 93005 ELECTROCARDIOGRAM TRACING: CPT | Performed by: FAMILY MEDICINE

## 2025-01-01 PROCEDURE — 87040 BLOOD CULTURE FOR BACTERIA: CPT

## 2025-01-01 PROCEDURE — 71045 X-RAY EXAM CHEST 1 VIEW: CPT

## 2025-01-01 PROCEDURE — 0BH17EZ INSERTION OF ENDOTRACHEAL AIRWAY INTO TRACHEA, VIA NATURAL OR ARTIFICIAL OPENING: ICD-10-PCS | Performed by: INTERNAL MEDICINE

## 2025-01-01 PROCEDURE — 83880 ASSAY OF NATRIURETIC PEPTIDE: CPT

## 2025-01-01 PROCEDURE — 2500000003 HC RX 250 WO HCPCS

## 2025-01-01 PROCEDURE — 84155 ASSAY OF PROTEIN SERUM: CPT

## 2025-01-01 PROCEDURE — 87206 SMEAR FLUORESCENT/ACID STAI: CPT

## 2025-01-01 PROCEDURE — 80051 ELECTROLYTE PANEL: CPT

## 2025-01-01 PROCEDURE — 87899 AGENT NOS ASSAY W/OPTIC: CPT

## 2025-01-01 PROCEDURE — 84100 ASSAY OF PHOSPHORUS: CPT

## 2025-01-01 PROCEDURE — 87641 MR-STAPH DNA AMP PROBE: CPT

## 2025-01-01 PROCEDURE — 83605 ASSAY OF LACTIC ACID: CPT

## 2025-01-01 PROCEDURE — 6360000002 HC RX W HCPCS: Performed by: INTERNAL MEDICINE

## 2025-01-01 PROCEDURE — 83516 IMMUNOASSAY NONANTIBODY: CPT

## 2025-01-01 PROCEDURE — 99223 1ST HOSP IP/OBS HIGH 75: CPT | Performed by: INTERNAL MEDICINE

## 2025-01-01 PROCEDURE — 5A1935Z RESPIRATORY VENTILATION, LESS THAN 24 CONSECUTIVE HOURS: ICD-10-PCS | Performed by: INTERNAL MEDICINE

## 2025-01-01 PROCEDURE — 94660 CPAP INITIATION&MGMT: CPT

## 2025-01-01 PROCEDURE — 85730 THROMBOPLASTIN TIME PARTIAL: CPT

## 2025-01-01 PROCEDURE — 36600 WITHDRAWAL OF ARTERIAL BLOOD: CPT

## 2025-01-01 PROCEDURE — 6370000000 HC RX 637 (ALT 250 FOR IP): Performed by: EMERGENCY MEDICINE

## 2025-01-01 PROCEDURE — 80053 COMPREHEN METABOLIC PANEL: CPT

## 2025-01-01 PROCEDURE — 86334 IMMUNOFIX E-PHORESIS SERUM: CPT

## 2025-01-01 PROCEDURE — 99285 EMERGENCY DEPT VISIT HI MDM: CPT

## 2025-01-01 PROCEDURE — 94002 VENT MGMT INPAT INIT DAY: CPT

## 2025-01-01 PROCEDURE — 83690 ASSAY OF LIPASE: CPT

## 2025-01-01 PROCEDURE — 87070 CULTURE OTHR SPECIMN AEROBIC: CPT

## 2025-01-01 PROCEDURE — 85025 COMPLETE CBC W/AUTO DIFF WBC: CPT

## 2025-01-01 PROCEDURE — 2580000003 HC RX 258: Performed by: EMERGENCY MEDICINE

## 2025-01-01 PROCEDURE — 83935 ASSAY OF URINE OSMOLALITY: CPT

## 2025-01-01 PROCEDURE — 87102 FUNGUS ISOLATION CULTURE: CPT

## 2025-01-01 PROCEDURE — 83930 ASSAY OF BLOOD OSMOLALITY: CPT

## 2025-01-01 PROCEDURE — 0202U NFCT DS 22 TRGT SARS-COV-2: CPT

## 2025-01-01 PROCEDURE — 80076 HEPATIC FUNCTION PANEL: CPT

## 2025-01-01 PROCEDURE — 2000000000 HC ICU R&B

## 2025-01-01 PROCEDURE — 84165 PROTEIN E-PHORESIS SERUM: CPT

## 2025-01-01 PROCEDURE — 99238 HOSP IP/OBS DSCHRG MGMT 30/<: CPT | Performed by: FAMILY MEDICINE

## 2025-01-01 PROCEDURE — 0B968ZX DRAINAGE OF RIGHT LOWER LOBE BRONCHUS, VIA NATURAL OR ARTIFICIAL OPENING ENDOSCOPIC, DIAGNOSTIC: ICD-10-PCS | Performed by: INTERNAL MEDICINE

## 2025-01-01 PROCEDURE — 86160 COMPLEMENT ANTIGEN: CPT

## 2025-01-01 PROCEDURE — 92950 HEART/LUNG RESUSCITATION CPR: CPT

## 2025-01-01 PROCEDURE — 89220 SPUTUM SPECIMEN COLLECTION: CPT

## 2025-01-01 PROCEDURE — 6360000002 HC RX W HCPCS: Performed by: EMERGENCY MEDICINE

## 2025-01-01 PROCEDURE — 81001 URINALYSIS AUTO W/SCOPE: CPT

## 2025-01-01 PROCEDURE — 83735 ASSAY OF MAGNESIUM: CPT

## 2025-01-01 PROCEDURE — 99233 SBSQ HOSP IP/OBS HIGH 50: CPT | Performed by: INTERNAL MEDICINE

## 2025-01-01 PROCEDURE — 5A2204Z RESTORATION OF CARDIAC RHYTHM, SINGLE: ICD-10-PCS | Performed by: INTERNAL MEDICINE

## 2025-01-01 PROCEDURE — 82787 IGG 1 2 3 OR 4 EACH: CPT

## 2025-01-01 PROCEDURE — 87015 SPECIMEN INFECT AGNT CONCNTJ: CPT

## 2025-01-01 PROCEDURE — 99254 IP/OBS CNSLTJ NEW/EST MOD 60: CPT | Performed by: INTERNAL MEDICINE

## 2025-01-01 PROCEDURE — 6360000002 HC RX W HCPCS: Performed by: STUDENT IN AN ORGANIZED HEALTH CARE EDUCATION/TRAINING PROGRAM

## 2025-01-01 PROCEDURE — 80048 BASIC METABOLIC PNL TOTAL CA: CPT

## 2025-01-01 PROCEDURE — 2500000003 HC RX 250 WO HCPCS: Performed by: EMERGENCY MEDICINE

## 2025-01-01 PROCEDURE — 99223 1ST HOSP IP/OBS HIGH 75: CPT | Performed by: FAMILY MEDICINE

## 2025-01-01 PROCEDURE — 87449 NOS EACH ORGANISM AG IA: CPT

## 2025-01-01 PROCEDURE — XW043E5 INTRODUCTION OF REMDESIVIR ANTI-INFECTIVE INTO CENTRAL VEIN, PERCUTANEOUS APPROACH, NEW TECHNOLOGY GROUP 5: ICD-10-PCS | Performed by: FAMILY MEDICINE

## 2025-01-01 RX ORDER — INDOMETHACIN 25 MG/1
100 CAPSULE ORAL ONCE
Status: COMPLETED | OUTPATIENT
Start: 2025-01-01 | End: 2025-01-01

## 2025-01-01 RX ORDER — 0.9 % SODIUM CHLORIDE 0.9 %
1000 INTRAVENOUS SOLUTION INTRAVENOUS ONCE
Status: COMPLETED | OUTPATIENT
Start: 2025-01-01 | End: 2025-01-01

## 2025-01-01 RX ORDER — SODIUM CHLORIDE 9 MG/ML
INJECTION, SOLUTION INTRAVENOUS PRN
Status: DISCONTINUED | OUTPATIENT
Start: 2025-01-01 | End: 2025-01-01 | Stop reason: HOSPADM

## 2025-01-01 RX ORDER — MAGNESIUM SULFATE IN WATER 40 MG/ML
2000 INJECTION, SOLUTION INTRAVENOUS PRN
Status: DISCONTINUED | OUTPATIENT
Start: 2025-01-01 | End: 2025-01-01

## 2025-01-01 RX ORDER — POLYETHYLENE GLYCOL 3350 17 G/17G
17 POWDER, FOR SOLUTION ORAL DAILY PRN
Status: DISCONTINUED | OUTPATIENT
Start: 2025-01-01 | End: 2025-01-01 | Stop reason: HOSPADM

## 2025-01-01 RX ORDER — MIDAZOLAM HYDROCHLORIDE 1 MG/ML
INJECTION, SOLUTION INTRAMUSCULAR; INTRAVENOUS
Status: COMPLETED
Start: 2025-01-01 | End: 2025-01-01

## 2025-01-01 RX ORDER — SODIUM CHLORIDE 0.9 % (FLUSH) 0.9 %
5-40 SYRINGE (ML) INJECTION EVERY 12 HOURS SCHEDULED
Status: DISCONTINUED | OUTPATIENT
Start: 2025-01-01 | End: 2025-01-01 | Stop reason: HOSPADM

## 2025-01-01 RX ORDER — FUROSEMIDE 10 MG/ML
40 INJECTION INTRAMUSCULAR; INTRAVENOUS ONCE
Status: DISCONTINUED | OUTPATIENT
Start: 2025-01-01 | End: 2025-01-01

## 2025-01-01 RX ORDER — NOREPINEPHRINE BITARTRATE 0.06 MG/ML
INJECTION, SOLUTION INTRAVENOUS
Status: COMPLETED
Start: 2025-01-01 | End: 2025-01-01

## 2025-01-01 RX ORDER — HYDROCORTISONE SODIUM SUCCINATE 100 MG/2ML
100 INJECTION INTRAMUSCULAR; INTRAVENOUS EVERY 8 HOURS
Status: DISCONTINUED | OUTPATIENT
Start: 2025-01-01 | End: 2025-01-01 | Stop reason: HOSPADM

## 2025-01-01 RX ORDER — INDOMETHACIN 25 MG/1
150 CAPSULE ORAL ONCE
Status: COMPLETED | OUTPATIENT
Start: 2025-01-01 | End: 2025-01-01

## 2025-01-01 RX ORDER — LINEZOLID 2 MG/ML
600 INJECTION, SOLUTION INTRAVENOUS EVERY 12 HOURS
Status: DISCONTINUED | OUTPATIENT
Start: 2025-01-01 | End: 2025-01-01 | Stop reason: HOSPADM

## 2025-01-01 RX ORDER — MORPHINE SULFATE 2 MG/ML
1 INJECTION, SOLUTION INTRAMUSCULAR; INTRAVENOUS EVERY 4 HOURS PRN
Status: DISCONTINUED | OUTPATIENT
Start: 2025-01-01 | End: 2025-01-01 | Stop reason: HOSPADM

## 2025-01-01 RX ORDER — DILTIAZEM HYDROCHLORIDE 5 MG/ML
5 INJECTION INTRAVENOUS ONCE
Status: DISCONTINUED | OUTPATIENT
Start: 2025-01-01 | End: 2025-01-01 | Stop reason: HOSPADM

## 2025-01-01 RX ORDER — HEPARIN SODIUM 10000 [USP'U]/100ML
INJECTION, SOLUTION INTRAVENOUS
Status: COMPLETED
Start: 2025-01-01 | End: 2025-01-01

## 2025-01-01 RX ORDER — EZETIMIBE 10 MG/1
10 TABLET ORAL DAILY
Status: ON HOLD | COMMUNITY
End: 2025-01-01 | Stop reason: HOSPADM

## 2025-01-01 RX ORDER — CALCIUM GLUCONATE 20 MG/ML
2000 INJECTION, SOLUTION INTRAVENOUS ONCE
Status: COMPLETED | OUTPATIENT
Start: 2025-01-01 | End: 2025-01-01

## 2025-01-01 RX ORDER — EZETIMIBE 10 MG/1
10 TABLET ORAL DAILY
Status: DISCONTINUED | OUTPATIENT
Start: 2025-01-01 | End: 2025-01-01 | Stop reason: HOSPADM

## 2025-01-01 RX ORDER — ALPRAZOLAM 0.25 MG
0.25 TABLET ORAL 3 TIMES DAILY PRN
Status: DISCONTINUED | OUTPATIENT
Start: 2025-01-01 | End: 2025-01-01 | Stop reason: HOSPADM

## 2025-01-01 RX ORDER — IOPAMIDOL 755 MG/ML
75 INJECTION, SOLUTION INTRAVASCULAR
Status: COMPLETED | OUTPATIENT
Start: 2025-01-01 | End: 2025-01-01

## 2025-01-01 RX ORDER — HEPARIN SODIUM 1000 [USP'U]/ML
2000 INJECTION, SOLUTION INTRAVENOUS; SUBCUTANEOUS PRN
Status: DISCONTINUED | OUTPATIENT
Start: 2025-01-01 | End: 2025-01-01 | Stop reason: HOSPADM

## 2025-01-01 RX ORDER — DEXTROSE MONOHYDRATE 100 MG/ML
INJECTION, SOLUTION INTRAVENOUS CONTINUOUS PRN
Status: DISCONTINUED | OUTPATIENT
Start: 2025-01-01 | End: 2025-01-01 | Stop reason: HOSPADM

## 2025-01-01 RX ORDER — FUROSEMIDE 10 MG/ML
40 INJECTION INTRAMUSCULAR; INTRAVENOUS ONCE
Status: COMPLETED | OUTPATIENT
Start: 2025-01-01 | End: 2025-01-01

## 2025-01-01 RX ORDER — PROPOFOL 10 MG/ML
5-50 INJECTION, EMULSION INTRAVENOUS ONCE
Status: DISCONTINUED | OUTPATIENT
Start: 2025-01-01 | End: 2025-01-01

## 2025-01-01 RX ORDER — MORPHINE SULFATE 2 MG/ML
2 INJECTION, SOLUTION INTRAMUSCULAR; INTRAVENOUS EVERY 4 HOURS PRN
Refills: 0 | Status: DISCONTINUED | OUTPATIENT
Start: 2025-01-01 | End: 2025-01-01 | Stop reason: HOSPADM

## 2025-01-01 RX ORDER — ACETAMINOPHEN 500 MG
1000 TABLET ORAL ONCE
Status: COMPLETED | OUTPATIENT
Start: 2025-01-01 | End: 2025-01-01

## 2025-01-01 RX ORDER — DILTIAZEM HYDROCHLORIDE 5 MG/ML
5 INJECTION INTRAVENOUS ONCE
Status: COMPLETED | OUTPATIENT
Start: 2025-01-01 | End: 2025-01-01

## 2025-01-01 RX ORDER — SODIUM CHLORIDE 0.9 % (FLUSH) 0.9 %
10 SYRINGE (ML) INJECTION PRN
Status: DISCONTINUED | OUTPATIENT
Start: 2025-01-01 | End: 2025-01-01 | Stop reason: HOSPADM

## 2025-01-01 RX ORDER — POTASSIUM CHLORIDE 7.45 MG/ML
10 INJECTION INTRAVENOUS PRN
Status: DISCONTINUED | OUTPATIENT
Start: 2025-01-01 | End: 2025-01-01

## 2025-01-01 RX ORDER — METOPROLOL TARTRATE 1 MG/ML
5 INJECTION, SOLUTION INTRAVENOUS EVERY 6 HOURS
Status: DISCONTINUED | OUTPATIENT
Start: 2025-01-01 | End: 2025-01-01 | Stop reason: HOSPADM

## 2025-01-01 RX ORDER — HYDROCORTISONE SODIUM SUCCINATE 100 MG/2ML
INJECTION INTRAMUSCULAR; INTRAVENOUS
Status: COMPLETED
Start: 2025-01-01 | End: 2025-01-01

## 2025-01-01 RX ORDER — MIDAZOLAM HYDROCHLORIDE 2 MG/2ML
2 INJECTION, SOLUTION INTRAMUSCULAR; INTRAVENOUS ONCE
Status: COMPLETED | OUTPATIENT
Start: 2025-01-01 | End: 2025-01-01

## 2025-01-01 RX ORDER — ACETAMINOPHEN 325 MG/1
650 TABLET ORAL EVERY 6 HOURS PRN
Status: DISCONTINUED | OUTPATIENT
Start: 2025-01-01 | End: 2025-01-01 | Stop reason: HOSPADM

## 2025-01-01 RX ORDER — CALCIUM GLUCONATE 20 MG/ML
1000 INJECTION, SOLUTION INTRAVENOUS ONCE
Status: COMPLETED | OUTPATIENT
Start: 2025-01-01 | End: 2025-01-01

## 2025-01-01 RX ORDER — POTASSIUM CHLORIDE 1500 MG/1
40 TABLET, EXTENDED RELEASE ORAL PRN
Status: DISCONTINUED | OUTPATIENT
Start: 2025-01-01 | End: 2025-01-01

## 2025-01-01 RX ORDER — HEPARIN SODIUM 1000 [USP'U]/ML
4000 INJECTION, SOLUTION INTRAVENOUS; SUBCUTANEOUS ONCE
Status: COMPLETED | OUTPATIENT
Start: 2025-01-01 | End: 2025-01-01

## 2025-01-01 RX ORDER — ENOXAPARIN SODIUM 100 MG/ML
40 INJECTION SUBCUTANEOUS DAILY
Status: DISCONTINUED | OUTPATIENT
Start: 2025-01-01 | End: 2025-01-01

## 2025-01-01 RX ORDER — SODIUM CHLORIDE 9 MG/ML
INJECTION, SOLUTION INTRAVENOUS CONTINUOUS
Status: DISCONTINUED | OUTPATIENT
Start: 2025-01-01 | End: 2025-01-01 | Stop reason: HOSPADM

## 2025-01-01 RX ORDER — PROPOFOL 10 MG/ML
INJECTION, EMULSION INTRAVENOUS
Status: DISPENSED
Start: 2025-01-01 | End: 2025-01-01

## 2025-01-01 RX ORDER — VASOPRESSIN IN DEXTROSE 5 % 20/100 ML
PLASTIC BAG, INJECTION (ML) INTRAVENOUS
Status: COMPLETED
Start: 2025-01-01 | End: 2025-01-01

## 2025-01-01 RX ORDER — DILTIAZEM HYDROCHLORIDE 5 MG/ML
INJECTION INTRAVENOUS
Status: COMPLETED
Start: 2025-01-01 | End: 2025-01-01

## 2025-01-01 RX ORDER — MIDAZOLAM HYDROCHLORIDE 1 MG/ML
1-10 INJECTION, SOLUTION INTRAVENOUS CONTINUOUS
Status: DISCONTINUED | OUTPATIENT
Start: 2025-01-01 | End: 2025-01-01 | Stop reason: HOSPADM

## 2025-01-01 RX ORDER — MECLIZINE HCL 25MG 25 MG/1
25 TABLET, CHEWABLE ORAL 3 TIMES DAILY PRN
Status: ON HOLD | COMMUNITY
End: 2025-01-01 | Stop reason: HOSPADM

## 2025-01-01 RX ORDER — 0.9 % SODIUM CHLORIDE 0.9 %
30 INTRAVENOUS SOLUTION INTRAVENOUS PRN
Status: DISCONTINUED | OUTPATIENT
Start: 2025-01-01 | End: 2025-01-01 | Stop reason: HOSPADM

## 2025-01-01 RX ORDER — ACETAMINOPHEN 650 MG/1
650 SUPPOSITORY RECTAL EVERY 6 HOURS PRN
Status: DISCONTINUED | OUTPATIENT
Start: 2025-01-01 | End: 2025-01-01 | Stop reason: HOSPADM

## 2025-01-01 RX ORDER — PHENYLEPHRINE HCL IN 0.9% NACL 50MG/250ML
10-300 PLASTIC BAG, INJECTION (ML) INTRAVENOUS CONTINUOUS
Status: DISCONTINUED | OUTPATIENT
Start: 2025-01-01 | End: 2025-01-01

## 2025-01-01 RX ORDER — 0.9 % SODIUM CHLORIDE 0.9 %
80 INTRAVENOUS SOLUTION INTRAVENOUS ONCE
Status: DISCONTINUED | OUTPATIENT
Start: 2025-01-01 | End: 2025-01-01 | Stop reason: HOSPADM

## 2025-01-01 RX ORDER — ONDANSETRON 2 MG/ML
4 INJECTION INTRAMUSCULAR; INTRAVENOUS EVERY 6 HOURS PRN
Status: DISCONTINUED | OUTPATIENT
Start: 2025-01-01 | End: 2025-01-01 | Stop reason: HOSPADM

## 2025-01-01 RX ORDER — ALPRAZOLAM 0.25 MG
0.25 TABLET ORAL 3 TIMES DAILY PRN
Status: ON HOLD | COMMUNITY
End: 2025-01-01 | Stop reason: HOSPADM

## 2025-01-01 RX ORDER — VASOPRESSIN IN DEXTROSE 5 % 20/100 ML
.01-.04 PLASTIC BAG, INJECTION (ML) INTRAVENOUS CONTINUOUS
Status: DISCONTINUED | OUTPATIENT
Start: 2025-01-01 | End: 2025-01-01 | Stop reason: HOSPADM

## 2025-01-01 RX ORDER — HEPARIN SODIUM 1000 [USP'U]/ML
4000 INJECTION, SOLUTION INTRAVENOUS; SUBCUTANEOUS PRN
Status: DISCONTINUED | OUTPATIENT
Start: 2025-01-01 | End: 2025-01-01 | Stop reason: HOSPADM

## 2025-01-01 RX ORDER — DIGOXIN 0.25 MG/ML
250 INJECTION INTRAMUSCULAR; INTRAVENOUS ONCE
Status: COMPLETED | OUTPATIENT
Start: 2025-01-01 | End: 2025-01-01

## 2025-01-01 RX ORDER — FENTANYL CITRATE 50 UG/ML
50 INJECTION, SOLUTION INTRAMUSCULAR; INTRAVENOUS
Status: DISCONTINUED | OUTPATIENT
Start: 2025-01-01 | End: 2025-01-01 | Stop reason: HOSPADM

## 2025-01-01 RX ORDER — ONDANSETRON 4 MG/1
4 TABLET, FILM COATED ORAL EVERY 8 HOURS PRN
Status: ON HOLD | COMMUNITY
End: 2025-01-01 | Stop reason: HOSPADM

## 2025-01-01 RX ORDER — DIGOXIN 0.25 MG/ML
250 INJECTION INTRAMUSCULAR; INTRAVENOUS EVERY 6 HOURS
Status: DISCONTINUED | OUTPATIENT
Start: 2025-01-01 | End: 2025-01-01 | Stop reason: HOSPADM

## 2025-01-01 RX ORDER — ALBUTEROL SULFATE 0.83 MG/ML
SOLUTION RESPIRATORY (INHALATION)
Status: DISCONTINUED
Start: 2025-01-01 | End: 2025-01-01 | Stop reason: WASHOUT

## 2025-01-01 RX ORDER — HEPARIN SODIUM 10000 [USP'U]/100ML
5-30 INJECTION, SOLUTION INTRAVENOUS CONTINUOUS
Status: DISCONTINUED | OUTPATIENT
Start: 2025-01-01 | End: 2025-01-01 | Stop reason: HOSPADM

## 2025-01-01 RX ORDER — GLUCAGON 1 MG/ML
1 KIT INJECTION PRN
Status: DISCONTINUED | OUTPATIENT
Start: 2025-01-01 | End: 2025-01-01 | Stop reason: HOSPADM

## 2025-01-01 RX ORDER — MIDAZOLAM HYDROCHLORIDE 1 MG/ML
INJECTION, SOLUTION INTRAVENOUS
Status: COMPLETED
Start: 2025-01-01 | End: 2025-01-01

## 2025-01-01 RX ORDER — SODIUM CHLORIDE 0.9 % (FLUSH) 0.9 %
5-40 SYRINGE (ML) INJECTION PRN
Status: DISCONTINUED | OUTPATIENT
Start: 2025-01-01 | End: 2025-01-01 | Stop reason: HOSPADM

## 2025-01-01 RX ORDER — INDOMETHACIN 25 MG/1
50 CAPSULE ORAL ONCE
Status: COMPLETED | OUTPATIENT
Start: 2025-01-01 | End: 2025-01-01

## 2025-01-01 RX ORDER — HYDROCORTISONE SODIUM SUCCINATE 100 MG/2ML
100 INJECTION INTRAMUSCULAR; INTRAVENOUS EVERY 8 HOURS
Status: DISCONTINUED | OUTPATIENT
Start: 2025-01-01 | End: 2025-01-01

## 2025-01-01 RX ORDER — VECURONIUM BROMIDE 1 MG/ML
10 INJECTION, POWDER, LYOPHILIZED, FOR SOLUTION INTRAVENOUS ONCE
Status: COMPLETED | OUTPATIENT
Start: 2025-01-01 | End: 2025-01-01

## 2025-01-01 RX ORDER — NOREPINEPHRINE BITARTRATE 0.06 MG/ML
1-100 INJECTION, SOLUTION INTRAVENOUS CONTINUOUS
Status: DISCONTINUED | OUTPATIENT
Start: 2025-01-01 | End: 2025-01-01 | Stop reason: HOSPADM

## 2025-01-01 RX ORDER — METOPROLOL TARTRATE 1 MG/ML
INJECTION, SOLUTION INTRAVENOUS
Status: COMPLETED
Start: 2025-01-01 | End: 2025-01-01

## 2025-01-01 RX ORDER — ONDANSETRON 4 MG/1
4 TABLET, ORALLY DISINTEGRATING ORAL EVERY 8 HOURS PRN
Status: DISCONTINUED | OUTPATIENT
Start: 2025-01-01 | End: 2025-01-01 | Stop reason: HOSPADM

## 2025-01-01 RX ORDER — CHLORAL HYDRATE 500 MG
1 CAPSULE ORAL DAILY
Status: DISCONTINUED | OUTPATIENT
Start: 2025-01-01 | End: 2025-01-01 | Stop reason: CLARIF

## 2025-01-01 RX ORDER — CHLORHEXIDINE GLUCONATE ORAL RINSE 1.2 MG/ML
15 SOLUTION DENTAL 2 TIMES DAILY
Status: DISCONTINUED | OUTPATIENT
Start: 2025-01-01 | End: 2025-01-01 | Stop reason: HOSPADM

## 2025-01-01 RX ORDER — TAMSULOSIN HYDROCHLORIDE 0.4 MG/1
0.4 CAPSULE ORAL DAILY
Status: DISCONTINUED | OUTPATIENT
Start: 2025-01-01 | End: 2025-01-01 | Stop reason: HOSPADM

## 2025-01-01 RX ORDER — DIGOXIN 0.25 MG/ML
250 INJECTION INTRAMUSCULAR; INTRAVENOUS 2 TIMES DAILY
Status: DISCONTINUED | OUTPATIENT
Start: 2025-01-01 | End: 2025-01-01

## 2025-01-01 RX ADMIN — IOPAMIDOL 75 ML: 755 INJECTION, SOLUTION INTRAVENOUS at 13:42

## 2025-01-01 RX ADMIN — HYDROCORTISONE SODIUM SUCCINATE 100 MG: 100 INJECTION, POWDER, FOR SOLUTION INTRAMUSCULAR; INTRAVENOUS at 16:08

## 2025-01-01 RX ADMIN — DEXTROSE 250 ML: 10 SOLUTION INTRAVENOUS at 06:59

## 2025-01-01 RX ADMIN — SODIUM BICARBONATE 100 MEQ: 84 INJECTION INTRAVENOUS at 10:18

## 2025-01-01 RX ADMIN — VASOPRESSIN 0.02 UNITS/MIN: 0.2 INJECTION INTRAVENOUS at 15:45

## 2025-01-01 RX ADMIN — PHENYLEPHRINE HYDROCHLORIDE 190 MCG/MIN: 50 INJECTION INTRAVENOUS at 02:00

## 2025-01-01 RX ADMIN — VECURONIUM BROMIDE 10 MG: 1 INJECTION, POWDER, LYOPHILIZED, FOR SOLUTION INTRAVENOUS at 15:58

## 2025-01-01 RX ADMIN — AMIODARONE HYDROCHLORIDE 150 MG: 1.5 INJECTION, SOLUTION INTRAVENOUS at 17:13

## 2025-01-01 RX ADMIN — DIGOXIN 250 MCG: 0.25 INJECTION INTRAMUSCULAR; INTRAVENOUS at 18:00

## 2025-01-01 RX ADMIN — FUROSEMIDE 40 MG: 10 INJECTION, SOLUTION INTRAMUSCULAR; INTRAVENOUS at 13:14

## 2025-01-01 RX ADMIN — FAMOTIDINE 20 MG: 10 INJECTION, SOLUTION INTRAVENOUS at 08:44

## 2025-01-01 RX ADMIN — METOPROLOL TARTRATE 5 MG: 5 INJECTION INTRAVENOUS at 15:10

## 2025-01-01 RX ADMIN — NOREPINEPHRINE BITARTRATE 100 MCG/MIN: 64 SOLUTION INTRAVENOUS at 09:35

## 2025-01-01 RX ADMIN — SODIUM CHLORIDE: 0.9 INJECTION, SOLUTION INTRAVENOUS at 19:16

## 2025-01-01 RX ADMIN — MIDAZOLAM HYDROCHLORIDE 2 MG/HR: 1 INJECTION, SOLUTION INTRAVENOUS at 16:12

## 2025-01-01 RX ADMIN — ENOXAPARIN SODIUM 40 MG: 100 INJECTION SUBCUTANEOUS at 14:19

## 2025-01-01 RX ADMIN — AMIODARONE HYDROCHLORIDE 0.5 MG/MIN: 1.8 INJECTION, SOLUTION INTRAVENOUS at 02:01

## 2025-01-01 RX ADMIN — LINEZOLID 600 MG: 600 INJECTION, SOLUTION INTRAVENOUS at 20:49

## 2025-01-01 RX ADMIN — HEPARIN SODIUM 4000 UNITS: 1000 INJECTION INTRAVENOUS; SUBCUTANEOUS at 17:34

## 2025-01-01 RX ADMIN — VASOPRESSIN 0.04 UNITS/MIN: 0.2 INJECTION INTRAVENOUS at 14:34

## 2025-01-01 RX ADMIN — PHENYLEPHRINE HYDROCHLORIDE 190 MCG/MIN: 50 INJECTION INTRAVENOUS at 06:17

## 2025-01-01 RX ADMIN — NOREPINEPHRINE BITARTRATE 45 MCG/MIN: 64 SOLUTION INTRAVENOUS at 22:56

## 2025-01-01 RX ADMIN — MEROPENEM 1000 MG: 1 INJECTION INTRAVENOUS at 22:03

## 2025-01-01 RX ADMIN — CALCIUM GLUCONATE 1000 MG: 20 INJECTION, SOLUTION INTRAVENOUS at 07:39

## 2025-01-01 RX ADMIN — AZITHROMYCIN MONOHYDRATE 500 MG: 500 INJECTION, POWDER, LYOPHILIZED, FOR SOLUTION INTRAVENOUS at 18:45

## 2025-01-01 RX ADMIN — VASOPRESSIN 0.04 UNITS/MIN: 0.2 INJECTION INTRAVENOUS at 23:02

## 2025-01-01 RX ADMIN — HEPARIN SODIUM 10 UNITS/KG/HR: 10000 INJECTION, SOLUTION INTRAVENOUS at 17:38

## 2025-01-01 RX ADMIN — AMIODARONE HYDROCHLORIDE 0.5 MG/MIN: 1.8 INJECTION, SOLUTION INTRAVENOUS at 13:47

## 2025-01-01 RX ADMIN — MIDAZOLAM HYDROCHLORIDE 2 MG: 1 INJECTION, SOLUTION INTRAMUSCULAR; INTRAVENOUS at 15:54

## 2025-01-01 RX ADMIN — SODIUM BICARBONATE: 84 INJECTION, SOLUTION INTRAVENOUS at 09:18

## 2025-01-01 RX ADMIN — PROPOFOL 70 MG: 10 INJECTION, EMULSION INTRAVENOUS at 18:37

## 2025-01-01 RX ADMIN — AMIODARONE HYDROCHLORIDE 0.5 MG/MIN: 1.8 INJECTION, SOLUTION INTRAVENOUS at 00:21

## 2025-01-01 RX ADMIN — VASOPRESSIN 0.04 UNITS/MIN: 0.2 INJECTION INTRAVENOUS at 06:55

## 2025-01-01 RX ADMIN — LINEZOLID 600 MG: 600 INJECTION, SOLUTION INTRAVENOUS at 12:02

## 2025-01-01 RX ADMIN — NOREPINEPHRINE BITARTRATE 50 MCG/MIN: 64 SOLUTION INTRAVENOUS at 04:35

## 2025-01-01 RX ADMIN — NOREPINEPHRINE BITARTRATE 100 MCG/MIN: 64 SOLUTION INTRAVENOUS at 14:55

## 2025-01-01 RX ADMIN — ACETAMINOPHEN 1000 MG: 500 TABLET ORAL at 11:41

## 2025-01-01 RX ADMIN — NOREPINEPHRINE BITARTRATE 25 MCG/MIN: 64 SOLUTION INTRAVENOUS at 18:12

## 2025-01-01 RX ADMIN — FAMOTIDINE 20 MG: 10 INJECTION, SOLUTION INTRAVENOUS at 18:42

## 2025-01-01 RX ADMIN — DIGOXIN 250 MCG: 0.25 INJECTION INTRAMUSCULAR; INTRAVENOUS at 08:44

## 2025-01-01 RX ADMIN — MIDAZOLAM HYDROCHLORIDE 2 MG: 1 INJECTION, SOLUTION INTRAMUSCULAR; INTRAVENOUS at 15:21

## 2025-01-01 RX ADMIN — MIDAZOLAM HYDROCHLORIDE 2 MG: 2 INJECTION, SOLUTION INTRAMUSCULAR; INTRAVENOUS at 15:54

## 2025-01-01 RX ADMIN — SODIUM CHLORIDE 1000 ML: 0.9 INJECTION, SOLUTION INTRAVENOUS at 11:45

## 2025-01-01 RX ADMIN — DILTIAZEM HYDROCHLORIDE 5 MG/HR: 5 INJECTION INTRAVENOUS at 15:08

## 2025-01-01 RX ADMIN — PHENYLEPHRINE HYDROCHLORIDE 300 MCG/MIN: 50 INJECTION INTRAVENOUS at 13:46

## 2025-01-01 RX ADMIN — EPINEPHRINE 1 MCG/MIN: 1 INJECTION INTRAMUSCULAR; INTRAVENOUS; SUBCUTANEOUS at 19:50

## 2025-01-01 RX ADMIN — REMDESIVIR 200 MG: 100 INJECTION, POWDER, LYOPHILIZED, FOR SOLUTION INTRAVENOUS at 00:37

## 2025-01-01 RX ADMIN — SODIUM BICARBONATE 150 MEQ: 84 INJECTION INTRAVENOUS at 21:25

## 2025-01-01 RX ADMIN — ACETAMINOPHEN 650 MG: 650 SUPPOSITORY RECTAL at 02:07

## 2025-01-01 RX ADMIN — Medication 25 MCG/MIN: at 18:12

## 2025-01-01 RX ADMIN — NOREPINEPHRINE BITARTRATE 100 MCG/MIN: 64 SOLUTION INTRAVENOUS at 12:22

## 2025-01-01 RX ADMIN — SODIUM CHLORIDE 1000 ML: 9 INJECTION, SOLUTION INTRAVENOUS at 20:07

## 2025-01-01 RX ADMIN — MIDAZOLAM HYDROCHLORIDE 2 MG: 2 INJECTION, SOLUTION INTRAMUSCULAR; INTRAVENOUS at 15:21

## 2025-01-01 RX ADMIN — 0.12% CHLORHEXIDINE GLUCONATE 15 ML: 1.2 RINSE ORAL at 08:46

## 2025-01-01 RX ADMIN — 0.12% CHLORHEXIDINE GLUCONATE 15 ML: 1.2 RINSE ORAL at 22:04

## 2025-01-01 RX ADMIN — MEROPENEM 1000 MG: 1 INJECTION INTRAVENOUS at 06:19

## 2025-01-01 RX ADMIN — SODIUM ZIRCONIUM CYCLOSILICATE 10 G: 10 POWDER, FOR SUSPENSION ORAL at 07:31

## 2025-01-01 RX ADMIN — AMIODARONE HYDROCHLORIDE 1 MG/MIN: 1.8 INJECTION, SOLUTION INTRAVENOUS at 18:10

## 2025-01-01 RX ADMIN — Medication 80 ML: at 13:42

## 2025-01-01 RX ADMIN — DILTIAZEM HYDROCHLORIDE 5 MG: 5 INJECTION, SOLUTION INTRAVENOUS at 15:07

## 2025-01-01 RX ADMIN — SODIUM CHLORIDE, PRESERVATIVE FREE 10 ML: 5 INJECTION INTRAVENOUS at 13:42

## 2025-01-01 RX ADMIN — PIPERACILLIN AND TAZOBACTAM 4500 MG: 4; .5 INJECTION, POWDER, LYOPHILIZED, FOR SOLUTION INTRAVENOUS at 13:12

## 2025-01-01 RX ADMIN — SODIUM BICARBONATE 50 MEQ: 84 INJECTION INTRAVENOUS at 06:25

## 2025-01-01 RX ADMIN — CALCIUM GLUCONATE 2000 MG: 20 INJECTION, SOLUTION INTRAVENOUS at 09:35

## 2025-01-01 RX ADMIN — HYDROCORTISONE SODIUM SUCCINATE 100 MG: 100 INJECTION, POWDER, FOR SOLUTION INTRAMUSCULAR; INTRAVENOUS at 08:46

## 2025-01-01 RX ADMIN — PHENYLEPHRINE HYDROCHLORIDE 10 MCG/MIN: 50 INJECTION INTRAVENOUS at 15:58

## 2025-01-01 RX ADMIN — ACETAMINOPHEN 650 MG: 650 SUPPOSITORY RECTAL at 18:48

## 2025-01-01 RX ADMIN — PHENYLEPHRINE HYDROCHLORIDE 200 MCG/MIN: 50 INJECTION INTRAVENOUS at 21:49

## 2025-01-01 RX ADMIN — DILTIAZEM HYDROCHLORIDE 5 MG: 5 INJECTION INTRAVENOUS at 15:07

## 2025-01-01 RX ADMIN — INSULIN HUMAN 10 UNITS: 100 INJECTION, SOLUTION PARENTERAL at 06:59

## 2025-01-01 RX ADMIN — HYDROCORTISONE SODIUM SUCCINATE 100 MG: 100 INJECTION, POWDER, FOR SOLUTION INTRAMUSCULAR; INTRAVENOUS at 00:14

## 2025-01-01 ASSESSMENT — ENCOUNTER SYMPTOMS
COUGH: 1
SORE THROAT: 0
VOMITING: 0
CONSTIPATION: 1
NAUSEA: 1
NAUSEA: 0
ABDOMINAL PAIN: 0
DIARRHEA: 0
VOMITING: 1
SHORTNESS OF BREATH: 1
DIARRHEA: 1

## 2025-01-01 ASSESSMENT — PAIN SCALES - GENERAL: PAINLEVEL_OUTOF10: 0

## 2025-01-01 ASSESSMENT — PULMONARY FUNCTION TESTS
PIF_VALUE: 30
PIF_VALUE: 33
PIF_VALUE: 34
PIF_VALUE: 35
PIF_VALUE: 36

## 2025-06-16 PROBLEM — N40.0 BENIGN PROSTATIC HYPERPLASIA WITHOUT LOWER URINARY TRACT SYMPTOMS: Status: ACTIVE | Noted: 2025-01-01

## 2025-06-16 PROBLEM — E87.1 HYPONATREMIA: Status: ACTIVE | Noted: 2025-01-01

## 2025-06-16 PROBLEM — E78.2 MIXED HYPERLIPIDEMIA: Status: ACTIVE | Noted: 2025-01-01

## 2025-06-16 PROBLEM — R09.81 NASAL CONGESTION: Status: ACTIVE | Noted: 2025-01-01

## 2025-06-16 PROBLEM — R65.20 SEPSIS WITH ACUTE HYPOXIC RESPIRATORY FAILURE (HCC): Status: ACTIVE | Noted: 2025-01-01

## 2025-06-16 PROBLEM — J18.9 PNEUMONIA OF RIGHT LOWER LOBE DUE TO INFECTIOUS ORGANISM: Status: ACTIVE | Noted: 2025-01-01

## 2025-06-16 PROBLEM — R42 VERTIGO: Status: ACTIVE | Noted: 2025-01-01

## 2025-06-16 PROBLEM — I48.91 ATRIAL FIBRILLATION WITH RAPID VENTRICULAR RESPONSE (HCC): Status: ACTIVE | Noted: 2025-01-01

## 2025-06-16 PROBLEM — Z78.9 FULL CODE STATUS: Status: ACTIVE | Noted: 2025-01-01

## 2025-06-16 PROBLEM — N17.9 AKI (ACUTE KIDNEY INJURY): Status: ACTIVE | Noted: 2025-01-01

## 2025-06-16 PROBLEM — Z87.891 FORMER SMOKER: Status: ACTIVE | Noted: 2025-01-01

## 2025-06-16 PROBLEM — A41.9 SEPSIS WITH ACUTE HYPOXIC RESPIRATORY FAILURE (HCC): Status: ACTIVE | Noted: 2025-01-01

## 2025-06-16 PROBLEM — D84.9 IMMUNOCOMPROMISED: Status: ACTIVE | Noted: 2025-01-01

## 2025-06-16 PROBLEM — J96.01 ACUTE RESPIRATORY FAILURE WITH HYPOXIA (HCC): Status: ACTIVE | Noted: 2025-01-01

## 2025-06-16 PROBLEM — J18.9 COMMUNITY ACQUIRED PNEUMONIA OF RIGHT LUNG: Status: ACTIVE | Noted: 2025-01-01

## 2025-06-16 PROBLEM — C61 PROSTATE CANCER (HCC): Status: ACTIVE | Noted: 2025-01-01

## 2025-06-16 PROBLEM — I21.4 NSTEMI (NON-ST ELEVATED MYOCARDIAL INFARCTION) (HCC): Status: ACTIVE | Noted: 2025-01-01

## 2025-06-16 PROBLEM — E87.4 ACID-BASE DISORDER, MIXED: Status: ACTIVE | Noted: 2025-01-01

## 2025-06-16 PROBLEM — F41.9 ANXIETY: Status: ACTIVE | Noted: 2025-01-01

## 2025-06-16 PROBLEM — D72.829 LEUKOCYTOSIS: Status: ACTIVE | Noted: 2025-01-01

## 2025-06-16 PROBLEM — J96.01 SEPSIS WITH ACUTE HYPOXIC RESPIRATORY FAILURE (HCC): Status: ACTIVE | Noted: 2025-01-01

## 2025-06-16 PROBLEM — C91.10 CLL (CHRONIC LYMPHOCYTIC LEUKEMIA) (HCC): Status: ACTIVE | Noted: 2025-01-01

## 2025-06-16 NOTE — H&P
[facility]  Inpatient Service     HISTORY AND PHYSICAL EXAMINATION            Date:   6/16/2025  Patient name:  Rose Marie Rivera  Date of admission:  6/16/2025 10:24 AM  MRN:   [id number]  Account:  [id number]  YOB: 1965  PCP:    Asael Acevedo MD  Room:   [address] [place] [address]  Code Status:    Full  hPOA Name:   Veena Merlos (Daughter, [phone])    History Obtained From:     patient    History of Present Illness:     Rose Marie Rivera is a 60 y.o.  male with a past medical history of CLL, prostate cancer, vertigo, Ménière's disease who presented to the emergency department for treatment of shortness of breath.  Patient reports that he has had significant shortness of breath since Saturday evening during which he went to the [facility] emergency department and was treated for chief complaint of dizziness with vertigo.  Since then his symptoms have gradually worsened and during an appointment with his primary care physician today he was advised to come to the emergency department immediately.    In the emergency [facility] emergency department the patient presented with significantly abnormal vital signs including a fever of 101.8, tachypnea for respiratory rate of 48, tachycardia for heart rate 147, hypertension with blood pressure 154/84, and hypoxia on room air at 77%.  The patient was quickly placed on BiPAP therapy with subsequent improvement in his oxygen status and other vital signs although they did remain significantly abnormal.  Underwent evaluation with lab testing and imaging studies.  Lab testing was notable for significant hyponatremia (125) with her previous test 2 days ago with a sodium of 130, acute kidney injury of creatinine of 1.9 with previous testing 2 days ago at 1.1, elevated troponin 96 with no further elevation on repeat, elevated BNP at 5372, CBC with elevated white blood cell count (43.6) and anemia

## 2025-06-16 NOTE — PROCEDURES
PROCEDURE NOTE  Date: 6/16/2025   Name: Rose Marie Rivera  YOB: 1965    Procedures    Emergent cardioversion    Patient required intubation and critical care pulmonary proceeded.  Once propofol was given for that procedure patient had pads placed and synchronized cardioversion was undertaken due to persistent tachycardia and hypotension with systolic pressures in the 80-90 range.  200 J synchronized cardioversion delivered and the patient had sinus rhythm but it only lasted for 2 to 3 minutes and then went back into rapid A-fib.  At that time attempts were made to use Cardizem IV as blood pressure allowed.  Unfortunately patient was also on pressor support and managed by crit care at this point in time.  Prognosis is poor.  Fiancé updated

## 2025-06-16 NOTE — PROCEDURES
PROCEDURE NOTE  Date: 6/16/2025   Name: Rose Marie Rivera  YOB: 1965    Procedures  Oral endotracheal intubation  Right femoral arterial line placement  Right jugular ultrasound-guided central line placement    Procedures performed under emergency circumstances    Physician: JONATHAN Conde    Description:  Levophed initiated due to hypotension and tachycardia.  Once Levophed was initiated I gave 2 mg of Versed.  After circulation time of 3 minutes I then administered propofol 50 mg followed by another 20 mg.  First attempt to intubate with 8.0 endotracheal tube I met resistance at the level of the larynx so I switched to a 7.5 endotracheal tube.  I administered another 20 mg of propofol and attempted again after Ambu bag rescue ventilation.  I was able to intubate on first attempt using video glide a scope.  Endotracheal tube fixed at 25 cm at the teeth with positive CO2 color change and breath sounds bilateral.    I did attempt to palpate pulse in all 4 limbs.  The pulse brachial was extremely thready and nonpalpable in the radial location.  I was able to feel a strong femoral pulse bilateral.  Right femoral site then prepped and draped in sterile fashion.  I was able to access the artery but not consistently until a third attempt.  After third attempt I was able to advance the guidewire without difficulty or resistance.  Catheter inserted over guidewire.  Positive arterial waveform on manometer.  Line sutured in place with sterile dressing applied    Right jugular site then prepped and draped in sterile fashion.  Was able to visualize the vein using ultrasound guidance.  Vein was accessed under direct ultrasound guidance without difficulty.  Catheter then inserted over guidewire.  All 3 ports flush and draw easily.  Line sutured in place with sterile dressing applied.    No immediate complications from any of the 3 procedures.    Estimated blood loss for combined procedures approximately 30 mL.    Postprocedural

## 2025-06-16 NOTE — CARE COORDINATION
Case Management Assessment  Initial Evaluation    Date/Time of Evaluation: 6/16/2025 2:54 PM  Assessment Completed by: Alma Delia Michelle    If patient is discharged prior to next notation, then this note serves as note for discharge by case management.    Patient Name: Rose Marie Rivera                   YOB: 1965  Diagnosis: Acute respiratory failure with hypoxia (HCC) [J96.01]  Acute respiratory failure, unspecified whether with hypoxia or hypercapnia (HCC) [J96.00]  Dyspnea, unspecified type [R06.00]  Pneumonia due to infectious organism, unspecified laterality, unspecified part of lung [J18.9]  Acute congestive heart failure, unspecified heart failure type (HCC) [I50.9]                   Date / Time: 6/16/2025 10:24 AM    Patient Admission Status: Inpatient   Readmission Risk (Low < 19, Mod (19-27), High > 27): No data recorded  Current PCP: Asael Acevedo MD  PCP verified by CM? Yes    Chart Reviewed: Yes      History Provided by: Patient  Patient Orientation: Alert and Oriented    Patient Cognition: Alert    Hospitalization in the last 30 days (Readmission):  No    If yes, Readmission Assessment in CM Navigator will be completed.    Advance Directives:      Code Status: Full Code   Patient's Primary Decision Maker is: Legal Next of Kin      Discharge Planning:    Patient lives with: Spouse/Significant Other Type of Home: House  Primary Care Giver: Self  Patient Support Systems include: Spouse/Significant Other, Children, Family Members, Parent   Current Financial resources: Other (Comment) (commercial insurance)  Current community resources: None  Current services prior to admission: (P) None            Current DME:              Type of Home Care services:  (P) None    ADLS  Prior functional level: Independent in ADLs/IADLs  Current functional level: Independent in ADLs/IADLs    PT AM-PAC:   /24  OT AM-PAC:   /24    Family can provide assistance at DC: Yes  Would you like Case Management to discuss

## 2025-06-16 NOTE — FLOWSHEET NOTE
ABG post intubation via Art-line: 7.032, pCO2 63, pO2 69.7, bicarb 16.8, B.E. -13.5 on vent settings AC/PRVC rate 30, Vt 450ml, Peep 10 @ 100. Reported to Dr Conde at bedside.

## 2025-06-16 NOTE — ED PROVIDER NOTES
heart failure type (HCC)    3. Acute respiratory failure, unspecified whether with hypoxia or hypercapnia (HCC)    4. Pneumonia due to infectious organism, unspecified laterality, unspecified part of lung        DISPOSITION:   Admitted    PATIENT REFERRED TO:   No follow-up provider specified.    DISCHARGE MEDICATIONS:     Current Discharge Medication List            Dennis Meadows DO  Emergency Physician Attending    (Please note that portions of this note were completed with a voice recognition program.  Efforts were made to edit the dictations but occasionally words are mis-transcribed.)       Dennis Meadows DO  06/16/25 1527

## 2025-06-17 PROBLEM — R06.00 DYSPNEA: Status: ACTIVE | Noted: 2025-01-01

## 2025-06-17 PROBLEM — J96.02 ACUTE RESPIRATORY FAILURE WITH HYPOXIA AND HYPERCAPNIA (HCC): Status: ACTIVE | Noted: 2025-01-01

## 2025-06-17 PROBLEM — E87.20 LACTIC ACIDOSIS: Status: ACTIVE | Noted: 2025-01-01

## 2025-06-17 PROBLEM — I95.9 ARTERIAL HYPOTENSION: Status: ACTIVE | Noted: 2025-01-01

## 2025-06-17 PROBLEM — U07.1 COVID-19 VIRUS INFECTION: Status: ACTIVE | Noted: 2025-01-01

## 2025-06-17 PROBLEM — K72.00 ACUTE LIVER FAILURE: Status: ACTIVE | Noted: 2025-01-01

## 2025-06-17 NOTE — SIGNIFICANT EVENT
Called to the bedside at 10:29 because the patient has had persistent fevers since 6pm that have continue to climb despite tylenol.  Unfortunately the patient's creatinine is 2.2 with a creatinine clearance of 39.  As patient's creatinine is not currently stable cannot fully trust the creatinine clearance calculation.  Because of the APRYL I am unable to start him on Motrin.  Discussed risk and benefits with Dr. Borrero who also did not feel comfortable with starting him on Motrin.  Attempted to see whether or not IV Tylenol was a possibility however it is not available in this hospital.  We also do not have cooling blankets or cold IV bags.  Ice packs have been placed all over the patient and I have also given the miscellaneous orders that the nurse can cool the IV bags in refrigerator.  Nurse also found a small fan that she placed over the bed to also help cool the patient down. Unfortunately we are very limited in what we can do.    At bedside the heart rate was 135, O2 94, respiratory rate 33, temperature 40 with a blood pressure of 108/60.  Patient's physical exam not changed from prior.  Talked with pharmacy regarding malignant hyperthermia and serotonergic syndrome to see if there is any medications that the patient was currently on that could be the cause.  We did discuss the fact that Zyvox can cause serotonergic syndrome however Zyvox was added after the fever started so it is not likely the cause of his persistent fever.  Considered whether or not propofol could be causing malignant hyperthermia and per up-to-date it is not one of the more commonly noted medications do this though pharmacy did note that any anesthetic medication has be ability to potentially cause this condition.  Either way its a situation of risks vs benefits as the patient needs the medications that he is on.    At around 11:30 PM results from [facility] came back positive for COVID.  Discussed starting Paxlovid with pharmacy and whether not

## 2025-06-17 NOTE — PLAN OF CARE
Problem: Safety - Adult  Goal: Free from fall injury  6/17/2025 0146 by Nina Chaudhry RN  Outcome: Progressing  Flowsheets (Taken 6/17/2025 0146)  Free From Fall Injury:   Instruct family/caregiver on patient safety   Based on caregiver fall risk screen, instruct family/caregiver to ask for assistance with transferring infant if caregiver noted to have fall risk factors     Problem: Safety - Medical Restraint  Goal: Remains free of injury from restraints (Restraint for Interference with Medical Device)  Description: INTERVENTIONS:  1. Determine that other, less restrictive measures have been tried or would not be effective before applying the restraint  2. Evaluate the patient's condition at the time of restraint application  3. Inform patient/family regarding the reason for restraint  4. Q2H: Monitor safety, psychosocial status, comfort, nutrition and hydration  6/17/2025 0146 by Nina Chaudhry, RN  Outcome: Progressing  Flowsheets (Taken 6/17/2025 0146)  Remains free of injury from restraints (restraint for interference with medical device):   Determine that other, less restrictive measures have been tried or would not be effective before applying the restraint   Evaluate the patient's condition at the time of restraint application   Inform patient/family regarding the reason for restraint   Every 2 hours: Monitor safety, psychosocial status, comfort, nutrition and hydration

## 2025-06-17 NOTE — SIGNIFICANT EVENT
Prognosis and goals of care were discussed with family at bedside. The patient's son states that he would not desire resuscitation if the patient loses a pulse again. Code status has been changed to DNR-CCA per family request.

## 2025-06-17 NOTE — SIGNIFICANT EVENT
Extensive discussion with multiple family members at bedside in the presence of nursing staff.  I discussed options again with the family.  It sounds like they are still wavering on their decision but have a much better understanding of how things transpired now.  Son does realize that the patient was ill for maybe 2 weeks before finally coming into urgent care on Saturday and primary care on Monday.    Ultimately, they did decide to proceed with attempt at high risk bronchoscopy knowing that this could cause arrest.  I will have them sign a consent saying the same.  We have never had adequate oxygen saturation since he has been on the ventilator despite efforts and maneuvers to maximize the lung function.    I will have them sign a consent and proceed.  I am hoping that there may be a mucous plug occluding the right middle lobe or right lower lobe that may biopsy a little time to make further decisions or reconsider transfer.  In his current circumstance with oxygen saturation 50% and unstable hemodynamics I do not believe he would survive transport.    Yunior Conde MD  Pulmonary and Critical Care  [phone] [employer]  [phone] Cell

## 2025-06-17 NOTE — CODE DOCUMENTATION
Spoke with the patient family at bedside after the Bronchoscopy. Both the patients children (Hermes Rivera and Veena Merlos) have agreed to switch the patient Code Status from FULL CODE to DNR-CCA. They would still like treatment measures at this time but do not want compression and are ok to withdraw treatment if the patients heart is to stop again.     DNR-CCA paperwork signed and placed in the patients chart.    Recent Blood gas shows some marginal improvement.  would like to attempt to prone the patient to see if we can improve his blood gas. Patient family is agreeable to this but wanted to wait to do this maneuver until another family member was able to see the patient.     Electronically signed by Sonya Richardson DO on 6/17/2025 at 1:27 PM

## 2025-06-17 NOTE — CARE COORDINATION
Call received from Pedro with [facility] stating that transportation will be here at 0930 to transport [place] [facility] [place]. ENID Desai notified that critical care transportation documentation needed. DAVI Esparza updated.

## 2025-06-17 NOTE — CODE DOCUMENTATION
0906: compression initiated  0908: 1mg epinephrine given  0910: pulse check, PEA, CPR resumed; 1mg epi given  0912: pulse check, PEA, CPR resumed  0913: son at bedside; decided to stop compressions; CPR stopped and pulse present; patient changed to DNR-CCA per son; , /75  0914: 1 amp bicarb given  0915: epinephrine drip initiated at 1mcg/min; ventilator settings: O2 100%, , PEEP 12, rate 30  0917: bicarb drip initiated (was due at 0715, but was delayed due to waiting for pharmacy to tube to unit)  0919: epinephrine drip increased to 2mcg/min; , BP 96/65  0926: 1 amp calcium chloride IV push administered, then 2g infusion initiated

## 2025-06-17 NOTE — DISCHARGE SUMMARY
[facility]  Inpatient Service    Discharge Summary     Patient ID: Rose Marie Rivera  :  1965   MRN: [id number]     ACCOUNT:  [id number]   Patient's PCP: Asael Acevedo MD  Admit Date: 2025   Discharge Date: 2025  Length of Stay: 1  Code Status:  DNR-CCA  Admitting Physician: Damaris Dias MD  Discharge Physician: Damaris Dias MD     Active Discharge Diagnoses:     Hospital Problem Lists:  Principal Problem:    Acute respiratory failure with hypoxia and hypercapnia (HCC)  Active Problems:    Community acquired pneumonia of right lung    Full code status    Former smoker    Nasal congestion    Anxiety    Mixed hyperlipidemia    Benign prostatic hyperplasia without lower urinary tract symptoms    Vertigo    APRYL (acute kidney injury)    Hyponatremia    Atrial fibrillation with rapid ventricular response (HCC)    NSTEMI (non-ST elevated myocardial infarction) (HCC)    Acid-base disorder, mixed    Sepsis with acute hypoxic respiratory failure (HCC)    Leukocytosis    Prostate cancer (HCC)    CLL (chronic lymphocytic leukemia) (HCC)    Immunocompromised    Pneumonia of right lower lobe due to infectious organism    COVID-19 virus infection    Arterial hypotension    Dyspnea    Lactic acidosis    Acute liver failure  Resolved Problems:    * No resolved hospital problems. *      Admission Condition:  critical     Discharged Condition:     Hospital Stay:     Hospital Course:  Rose Marie Rivera is a 60 y.o. male with a PMH of CLL, prostate cancer, Ménière's disease who presented to the ED after being advised to by his PCP with complaint of worsening difficulty breathing for several days.    In the [facility] emergency department the patient presented with significantly abnormal vital signs including a fever of 101.8, tachypnea for respiratory rate of 48, tachycardia for heart rate 147, hypertension with blood pressure 154/84,

## 2025-06-17 NOTE — DEATH NOTES
Death Pronouncement Note  Patient's Name: Rose Marie Rivera   Patient's YOB: 1965  MRN Number: [id number]    Admitting Provider: Damaris Dias MD  Attending Provider: Damaris Dias MD    Patient was examined and the following were absent: Pulses, Blood Pressure, and Respiratory effort    I declared the patient dead on 6/17/2025 at 3:33 PM    Preliminary Cause of Death: Community acquired pneumonia of right lower lobe of lung     Electronically signed by Martínez Medley DO on 6/17/25 at 3:40 PM EDT

## 2025-06-17 NOTE — SIGNIFICANT EVENT
Daughter did arrive at bedside.  Son also at bedside.  Seems to be some discrepancy in wishes per family so they are discussing.  I did bring the daughter up to speed on how critically ill he is.    She was under the impression that he was \"stable\".  I explained to her that that was not the case and that is not what I discussed with her yesterday.  He has remained unstable since I met him at 3 PM yesterday.  He has been so unstable that transfer is probably not safe yesterday nor today.  I do not believe he would survive a trip to make it to another hospital in his current condition nor yesterday.  We have not been able to adequately ventilate him or oxygenate him throughout the entirety of this nor maintain adequate perfusional pressure.    Family still struggling with the decision.  I did discuss potential of high risk bronchoscopy with him at bedside but it is entirely possible that he would arrest during the procedure.  His current oxygen saturation is 50% range with pH 6.99 following arrest.  Bicarbonate drip has been initiated and I did request 2 more amp push of bicarbonate now.    He is currently underventilated and under oxygenated.  I did tweak the ventilator reducing the tidal volume to 430 mL allowing for permissive hypercapnia and increase the PEEP to 15 from 12.  Peak airway pressures are currently 37-38 which is high but acceptable for now.  Plateau pressure less than 35.    Patient very critically ill.  Ongoing discussions with palliative care, nursing staff, primary team and multiple family members at bedside.    >65mCCT    Yunior Conde MD  Pulmonary and Critical Care  [phone] Perfect Serve  [phone] Cell

## 2025-06-17 NOTE — CONSULTS
Today's Date: 6/17/2025  Patient Name: Rose Marie Rivera  Date of admission: 6/16/2025 10:24 AM  Patient's age: 60 y.o., 1965  Admission Dx: Acute respiratory failure with hypoxia (HCC) [J96.01]  Acute respiratory failure, unspecified whether with hypoxia or hypercapnia (HCC) [J96.00]  Dyspnea, unspecified type [R06.00]  Pneumonia due to infectious organism, unspecified laterality, unspecified part of lung [J18.9]  Acute congestive heart failure, unspecified heart failure type (HCC) [I50.9]    Reason for Consult: New hilar lung mass, hsitory of CLL and prostate cancer   Requesting Physician: Damaris Dias MD    CHIEF COMPLAINT:    Chief Complaint   Patient presents with    Shortness of Breath     Sent over by PCP for trouble breathing; seen here ER over weekend for HA and vertigo. Arrives acute SOB, dyspnea. Hx CLL leukemia worsening and prostate CA. Sees oncologist at [facility] for cancer.       History Obtained From:  patient family, staff and chart    HISTORY OF PRESENT ILLNESS:      Rose Marie Rivera is a 60 y.o. male who is admitted to the hospital on 6/16/2025  for shortness of breath    Patient admitted with COVID-19 infection and currently on mechanical ventilation with high oxygen requirement.  He is on multiple pressor support and critically ill.  Is noted to have acute kidney injury and has been evaluated by nephrology    Patient had a PEA arrest on 6/16/2025 requiring resuscitation and subsequently transferred to [facility] was canceled.    Patient has past medical has prostate cancer and also history of CLL and SLL and has been following with oncology at [facility] with most recent visit on 5/5/2025.    Brief oncologic history as per last [facility] note as follows    Aug 2021: Diagnosed with prostate cancer and came to [facility] for second opinion. Saw Dr. Olsen and was told HIFU procedure could not be preformed here and that he could monitor the progression of disease with PSA levels 
  [facility]   CONSULT NOTE:      DATE OF CONSULT 6/16/2025    REASON FOR CONSULTATION:  Acute hypoxic respiratory failure      PCP Asael Acevedo MD     CHIEF COMPLAINT: Shortness of breath    HISTORY OF PRESENT ILLNESS:     History obtained from patient and patient's friend.  Patient has been experiencing a week of vertigo, migraines, nausea, and about 8 episodes of NBNB emesis.  The symptoms prompted him to present to an urgent care on Saturday who referred him to [facility] ER.  ER workup was notable for unremarkable CT brain and leukocytosis with WBC 40 (white blood cell count decreased from 70s).  He started experienced memory issues, trouble walking, poor appetite, and elevated temperature (T 99 F and 101 F).  Subsequently, he went to his PCP who told him to go to the ED for further relation.  Patient denies recent travel, recent hospitalizations, recent sick contacts.  Patient denies chest pain, palpitations, and previous episodes.  Patient endorsed urinary frequency and urinary dribbling; he denied hematuria patient and friend reported history of intermittent vertigo that typically happens a few times a year.  Patient and friend reported he has a history of CLL and prostate cancer.  Patient is established with urology and oncology [facility].  Patient reported he used to smoke about 1 pack a day of tobacco cigarettes for about 20 years.  Past medical history also includes BPH with Flomax.      In the ED, initial vitals notable for fever (T101.8F), tachycardia (), tachypnea (RR 48), hypertension (/84), and hypoxia (SpO2 77% on room air).  Lactic sepsis with elevated 2.8.  CMP notable for hyponatremia (sodium 125), hyperglycemia (145), elevated BUN 32, elevated creatinine 1.9, and EGFR 40.  Lipase decreased to 70.  CBC notable for leukocytosis (23.6), microcytic anemia (Hgb 11.3, MCV 96.1), and absolute neutrophils at 0.44.  D-dimer elevated at 
..  Palliative Care Inpatient Consult    NAME:  Rose Marie Rivera  MEDICAL RECORD NUMBER:  [id number]  AGE: 60 y.o.   GENDER: male  : 1965  TODAY'S DATE:  2025    Reasons for Consultation:    Provision of information regarding PC and/or hospice philosophies  Complex, time-intensive communication and interdisciplinary psychosocial support  Clarification of goals of care and/or assistance with difficult decision-making  Guidance in regards to resources and transition(s)    Code Status: DNRCCA    Members of PC team contributing to this consultation are :  Michelle Maloney RN    PLAN:  -Pt remains in critical condition. 4 pressors. Code blue x1. Full vent support.   -Support offered to family as they are struggling to accept this sudden illness.   -Family has decided on DNRCCA for now.   -Pt's children Keanu and Veena are medical decision makers. If they cannot agree on decisions, may need to locate estranged daughter for majority.   -All doctors on the case are relaying the same poor prognosis to the family.   -Will continue to follow for support.     History of Present Illness     The patient is a 60 y.o.  Non- / non  male who presents with Shortness of Breath (Sent over by PCP for trouble breathing; seen here ER over weekend for HA and vertigo. Arrives acute SOB, dyspnea. Hx CLL leukemia worsening and prostate CA. Sees oncologist at [facility] for cancer.)    Referred to Palliative Care by   [x] Physician   [] Nursing  [] Family Request   [] Other:       He was admitted to the primary service for Acute respiratory failure with hypoxia (HCC) [J96.01]  Acute respiratory failure, unspecified whether with hypoxia or hypercapnia (HCC) [J96.00]  Dyspnea, unspecified type [R06.00]  Pneumonia due to infectious organism, unspecified laterality, unspecified part of lung [J18.9]  Acute congestive heart failure, unspecified heart failure type (HCC) [I50.9]. His hospital course has been associated with 
Renal Consult Note    Patient :  Rose Marie Rivera; 60 y.o. MRN# [id number]  Location:  [facility]/337-01  Attending:  Damaris Dias MD  Admit Date:  6/16/2025   Hospital Day: 0    Reason for Consult:     Asked by Damaris Sanchez MD to see for APRYL/Elevated Creatinine.    History Obtained From:     patient    History of Present Illness:     Rose Marie Rivera; 60 y.o. male with past medical history as mentioned below.  Known history of chronic lymphocytic leukemia diagnosed recently, disease appears to be progressing.  Recently had a bone marrow biopsy done at [facility] and was found to have 90% cells in the bone marrow according to his fiancée.  He is planned for chemotherapy at some point.  He also has known history of prostate cancer follows up with urology at [facility].  In addition he has known history of BPH currently on Flomax.  Patient has been ill for about a week to 10 days.  Has been having persistent symptoms of nausea, vertigo, difficulty in ambulation and ataxia.  He is also been vomiting more frequently in the last week or so.  Oral intake has been extremely poor.  Has been managing to drink fluids though.  He went to urgent care last week was seen by physicians, creatinine is not available however GFR was 77 according to the records and potassium was four 4.1.  He underwent CT of the head which was unremarkable.  He was then discharged home.  He is continue to decline and in the last day or so has had shortness of breath and chest congestion as well.  Denies any cough or phlegm.  Urine output is decreased as well.  On presentation to the ER he was found to be normotensive but tachypneic.  Required oxygen.  ABG showed a pH of 7.48 TDW274  HCO3 19.  He also had a sodium of 125 and a creatinine of 1.9.    Nephrology was consulted.  Chest x-ray was done on admission did not show any overt vascular congestion or pulmonary edema.  It showed abdominal contusion in the right mediastinum.  CT PE has been 
effort was made to ensure accuracy; however, inadvertent computerized transcription errors may be present.  Disclaimer: This note was dictated by speech recognition. Minor errors in  transcription may be present

## 2025-06-17 NOTE — SIGNIFICANT EVENT
Received a perfect serve as 5:34 AM regarding patient's creatinine coming back at 3.4.  Reviewed his lab results and also noted that his potassium came back at 5.7, calcium 5.9.  Added a hepatic function panel to his lab work in addition to ionized calcium.  Ordered a serum osmolality, urine sodium, urine creatinine and urine osmolality.  Renal ultrasound still pending.  Insulin and glucose and twelve-lead EKG also ordered.  Consulted nephrology who also recommended stopping the normal saline and switching to sodium bicarbonate 150 mill equivalents and D5W at 50 cc/h, and adding Lokelma 10 g.  Originally I had not ordered Lokelma because of the patient's n.p.o. status but was able to find that it could go down the NG tube and the order was placed accordingly.  Per nephrology if the patient stabilizes some and remains full code he will need to be transferred to [facility] for the possibility of Continuous Veno-Venous Hemodialysis.    Physical exam otherwise unchanged patient remains febrile but is not going higher than 39.9 °C at this time with respiratory rate of 35 and a heart rate of 137. Still currently on pressors but he has decreased from 4 pressors to 3 pressors with epinephrine being taken off.  Vent setting still maxed out.  Hepatic function results came back.  Patient likely has shock liver with his ALT elevated now at 2294 and AST 5823.    Juany Albarado, PGY-2   Family Medicine  6/17/2025  7:19 AM

## 2025-06-17 NOTE — PROCEDURES
PROCEDURE NOTE  Date: 6/17/2025   Name: Rose Marie Rivera  YOB: 1965    Procedures  Bronchoscopy with right lower lobe washings    Physician: JONATHAN Conde    Informed consent signed prior to procedure by the family    Indication: Intractable hypoxia and inability to ventilate    Description:  Bronchoscope entered via endotracheal tube.  Patient was on 100% oxygen and 15 of PEEP.  I visually inspected the central airways.  Left-sided airways were patent.  Despite the very dense consolidation of the right middle and right lower lobe there is no endobronchial mass or obstruction to the airways.  The airways are patent.  I did perform 20 mL instillation of saline into the right lower lobe and collected a trap for culture.  I did not think the patient could tolerate any longer because he was having high peak airway pressures and started to develop hypotension into the 80s.  I withdrew the scope and will send the sample for culture.  I did update the family.  No complications.  No blood loss.    Yunior Conde MD  Pulmonary and Critical Care  [phone] Perfect Serve  [phone] Cell

## 2025-06-18 LAB
ANA SER QL IA: NEGATIVE
ANCA MYELOPEROXIDASE: <0.3 AU/ML (ref 0–3.5)
ANCA PROTEINASE 3: <0.7 AU/ML (ref 0–2)
DSDNA IGG SER QL IA: 1.7 IU/ML
IGG 1: 377 MG/DL (ref 240–1118)
IGG 2: 110 MG/DL (ref 124–549)
IGG 3: 89 MG/DL (ref 21–134)
IGG4 SER-MCNC: 1 MG/DL (ref 1–123)
MICROORGANISM SPEC CULT: NORMAL
MICROORGANISM SPEC CULT: NORMAL
MICROORGANISM/AGENT SPEC: NORMAL
MICROORGANISM/AGENT SPEC: NORMAL
NUCLEAR IGG SER IA-RTO: 0.3 U/ML
SPECIMEN DESCRIPTION: NORMAL
SPECIMEN DESCRIPTION: NORMAL

## 2025-06-18 NOTE — PROGRESS NOTES
Pulmonary Progress Note  [facility]monary and Critical Care Specialists  Dr Ministerio Monsalve/Dr Sang Conde/Dr Wing PERERA AGACNP-BC  Susie PERERA NP-C      Patient - Rose Marie Rivera,  Age - 60 y.o.    - 1965      Room Number - 337/337-01   [facility] -  [id number]   [facility] # - [id number]  Date of Admission -  2025 10:24 AM        Consulting Service/Physician   Consulting - Damaris Dias MD  Primary Care Physician - Asael Acevedo MD     SUBJECTIVE   Patient nonresponsive to verbal conversation so subjective not obtained.  Informed by primary team, the patient had fevers overnight refractory to Tylenol and that labs show significantly elevated RFT's and LFTs.  Patient received ice packs and cold IV bags.  Patient was positive for COVID symptoms to be restarted.  Labs show significantly elevated RFT's and LFTs and elevated potassium.  Critical care and nephrology were contacted.  Calcium gluconate, sodium bicarb, insulin, glucose, and Lokelma were ordered.  See significant event note for further information.    OBJECTIVE   VITALS    height is 1.676 m (5' 6\") and weight is 97.5 kg (215 lb). His temperature is 103.8 °F (39.9 °C) (abnormal). His blood pressure is 78/36 (abnormal) and his pulse is 137 (abnormal). His respiration is 35 (abnormal) and oxygen saturation is 76% (abnormal).     Body mass index is 34.7 kg/m².  Temperature Range: Temp: (!) 103.8 °F (39.9 °C) Temp  Av.2 °F (39.6 °C)  Min: 98.4 °F (36.9 °C)  Max: 104.4 °F (40.2 °C)  BP Range:  Systolic (24hrs), Av , Min:38 , Max:154     Diastolic (24hrs), Av, Min:14, Max:118    Pulse Range: Pulse  Av.7  Min: 89  Max: 171  Respiration Range: Resp  Av.9  Min: 9  Max: 52  Current Pulse Ox::  SpO2: (!) 76 %  24HR Pulse Ox Range:  SpO2  Av.5 %  Min: 46 %  Max: 100 %  Oxygen Amount and Delivery:      Wt Readings from Last 3 Encounters:   25 
          [facility] Carin Ellison M.D., M.H.A.  Dennis Del Rosario M.D., M.B.A.      [facility]  [address]    Cardio-Oncology Service  [address], [address]  [place] [address]   [address] [place] [address]  Phone: [phone]   [place] [address]   Phone: [phone]  Fax: [phone]    [phone]   Fax: [phone]          DAILY HOSPITAL PROGRESS NOTE     # DAYS IN HOSPITAL: 1  ADMIT DATE: 6/16/2025        SUBJECTIVE:     Follow-up cardiac visit patient with respiratory failure now found to have COVID-pneumonia.  Shock liver and acute kidney injury from hypotension.  Remained intermittently febrile overnight.  Remains on 3 pressors and has persistent A-fib with rates in the 130 range.  Amiodarone drip was initiated overnight.        VITALS:       Vitals:    06/17/25 0320 06/17/25 0330 06/17/25 0340 06/17/25 0350   BP:       Pulse: (!) 120 (!) 133 (!) 138 (!) 137   Resp: (!) 37 (!) 36 (!) 34 (!) 35   Temp: (!) 103.8 °F (39.9 °C) (!) 103.8 °F (39.9 °C) (!) 103.8 °F (39.9 °C) (!) 103.8 °F (39.9 °C)   TempSrc:       SpO2:       Weight:       Height:         I/O last 3 completed shifts:  In: 5783.5 [I.V.:4824.5; IV Piggyback:959]  Out: 631 [Urine:531; Emesis/NG output:100]  No intake/output data recorded.      PHYSICAL EXAM:   Intubated and sedated  Lungs diminished bilateral.  No rales  Heart distant irregular tachycardic  Extremities cool      CURRENT MEDICATIONS:      [START ON 6/18/2025] remdesivir 100 mg in sodium chloride 0.9 % 250 mL IVPB  100 mg IntraVENous Q24H    calcium gluconate-NaCl  1,000 mg IntraVENous Once    sodium chloride flush  5-40 mL IntraVENous 2 times per day    sodium chloride  80 mL IntraVENous Once    [Held by provider] urea  15 g Oral Daily    metoprolol  5 mg IntraVENous Q6H    hydrocortisone sodium succinate PF  100 mg IntraVENous Q8H    
    [facility] Residency  Inpatient Service     Progress Note  6/17/2025    2:39 PM    Name:   Rose Marie Rivera  MRN:     [id number]     Acct:      [id number]   Room:   [facility]337-01   Day:  1  Admit Date:  6/16/2025 10:24 AM    PCP:   Asael Acevedo MD  Code Status:  DNR-CCA    Subjective:     Overnight events: Per overnight team the patient continued to demonstrate worsening fevers that did not respond to Tylenol and given elevated creatinine clearance the patient was not able he started on Motrin.  This was discussed with Dr. Borrero (critical care) and it was attempted to see if the hospital had cooling blankets or cold ice bags.  Hospitals found to have ice bags which were placed over the patient but did not have cooling blankets.  Patient's testing did come back with RPP results indicating positivity of COVID and after discussion with pharmacy patient was started on remdesivir with plan for ID consult    Pt was examined at bedside laying in bed intubated and sedated with multiple pressors on board at this time.  Vital signs at bedside continue to remain abnormal with elevated temperature, heart rate, respiratory rate.  Patient's son was at bedside and advised on the continued guarded prognosis of his father and potential plan for transfer for dialysis treatment versus continuous renal replacement therapy.    Following a review of the patient's morning lab testing decision was made to transfer the patient to [facility] for higher level of care.  Dr. Marino (critical care hospitalist) was contacted and agreed to accept the patient for transfer with arrangements made for MICU transportation.  Son was updated on plan for transfer    During interval between acceptance for transfer and MICU transportation the patient went into cardiac arrest and was successfully restituted following multiple rounds of CPR.  Following return of spontaneous circulation 
  Physician Progress Note      PATIENT:               YOBANI HARVEY  CSN #:                  [id number]  :                       1965  ADMIT DATE:       2025 10:24 AM  DISCH DATE:  RESPONDING  PROVIDER #:        JERRICA COLLINS          QUERY TEXT:    ER Provider documenting, \"Acute congestive heart failure\". Pulmonary consult   documenting, \"New Onset CHF\". Please document the type and acuity:    The clinical indicators include:  ER Provider documenting, \"Acute congestive heart failure\". Pulmonary consult   documenting, \"New Onset CHF\".   CXR: Cardiomegaly with right basilar infiltrate.  Echo  Severely reduced left ventricular systolic function with EF 25%.   Global hypokinesis present. Indeterminate diastolic function. The right   ventricular systolic function appears reduced.  NT Pro-BNP: 5372  Elevated Troponin HS:  148<97<96<96  Options provided:  -- Acute Systolic CHF/HFrEF  -- Acute Systolic and Diastolic CHF  -- Other - I will add my own diagnosis  -- Disagree - Not applicable / Not valid  -- Disagree - Clinically unable to determine / Unknown  -- Refer to Clinical Documentation Reviewer    PROVIDER RESPONSE TEXT:    This patient is in acute systolic CHF/HFrEF.    Query created by: Pili Brown on 2025 11:17 AM      Electronically signed by:  JERRICA COLLINS 2025 11:45 AM          
Comprehensive Nutrition Assessment    Type and Reason for Visit:  Initial, Consult, Nutrition support    Nutrition Recommendations/Plan:   EN to be initiated once pt is hemodynamically stable      Nutrition Assessment:    Consult to Registered Dietitian for enteral nutrition order and management. Patient currently intubated, ICU status. Plan for bronchoscopy today. Patient currently not hemodynamically stable - enteral support contraindicated at this time. Recommend to initiate Jevity 1.5, goal rate pending, once patient has reached hemodynamic stability. RD to continue to follow.    Current Nutrition Intake & Therapies:    Average Meal Intake: NPO     Diet NPO    Anthropometric Measures:  Height: 167.6 cm (5' 5.98\")  Ideal Body Weight (IBW): 142 lbs (65 kg)       Current Body Weight:  ,   IBW. Weight Source: Stated  Current BMI (kg/m2):          BMI Categories: Obese Class 1 (BMI 30.0-34.9)    Estimated Daily Nutrient Needs:  Energy Requirements Based On: Formula  Weight Used for Energy Requirements: Current  Energy (kcal/day): 5160-1777  Weight Used for Protein Requirements: Current  Protein (g/day):   Method Used for Fluid Requirements: Defer to provider  Fluid (ml/day):      Nutrition Diagnosis:   Inadequate oral intake, in context of acute, non-illness related related to impaired respiratory function as evidenced by NPO or clear liquid status due to medical condition, intubation, nutrition support - enteral nutrition    Nutrition Interventions:         Coordination of Nutrition Care: Continue to monitor while inpatient, Interdisciplinary Rounds       Goals:  Goals: Initiate nutrition support, within 2 days  Type of Goal: New goal       Nutrition Monitoring and Evaluation:      Food/Nutrient Intake Outcomes: Enteral Nutrition Intake/Tolerance       Discharge Planning:    Too soon to determine     Mellisa Titus RD  Contact:     
Death in restraints entered into CMS log as required.     6/18/25    135  
[facility]   Pharmacy Pharmacokinetic Monitoring Service - Vancomycin     Rose Marie Rivera is a 60 y.o. male starting on vancomycin therapy for CAP. Pharmacy consulted by Martínez Medley  for monitoring and adjustment.    Target Concentration: Dosing based on anticipated concentration <15 mg/L due to renal impairment/insufficiency    Additional Antimicrobials: azithromycin, cefepime    Pertinent Laboratory Values:   Wt Readings from Last 1 Encounters:   06/16/25 97.5 kg (215 lb)     Temp Readings from Last 1 Encounters:   06/16/25 (!) 101.1 °F (38.4 °C) (Axillary)     Estimated Creatinine Clearance: 39 mL/min (A) (based on SCr of 2.2 mg/dL (H)).  Recent Labs     06/16/25  1040 06/16/25  1733   CREATININE 1.9* 2.2*   BUN 32* 36*   WBC 43.6* 35.7*     Procalcitonin:     Pertinent Cultures:  Culture Date Source Results        MRSA Nasal Swab: was ordered by provider, awaiting results.    Plan:  Concentration-guided dosing due to renal impairment/insufficiency  Start vancomycin 1250 mg x1  Renal labs as indicated   Vancomycin concentration ordered for 6/17 @ noon   Pharmacy will continue to monitor patient and adjust therapy as indicated    Thank you for the consult,  GUY DOMINGUEZ RPH  6/16/2025 6:58 PM    
Occupational Therapy    [facility]  Occupational Therapy Not Seen Note    DATE: 2025    NAME: Rose Marie Rivera  MRN: [id number]   : 1965      Patient not seen this date for Occupational Therapy due to:    Patient is not appropriate for active participation in OT evaluation/treatment at this time d/t  being on ventilation. Will continue to follow for OT eval as appropriate.     Next Scheduled Treatment: 25    Electronically signed by Juan Jacobson OT on 2025 at 11:30 AM    
PHARMACY NOTE:    The electrolyte replacement protocol for potassium/magnesium has been discontinued per P&T guidelines because the patient has reduced renal function (CrCl < 30 mL/min).      The patient's most recent potassium & magnesium levels are:  Recent Labs     06/16/25  1040 06/16/25  1733 06/16/25  2253 06/17/25  0510   K 4.6 4.7 5.2 5.7*   MG 1.6 2.1  --  2.4     Estimated Creatinine Clearance: 25 mL/min (A) (based on SCr of 3.4 mg/dL (H)).    For patients with decreased renal function (below 30ml/min) needing potassium/magnesium supplementation, please order individual bolus doses with appropriate monitoring.      Please contact the inpatient pharmacy with any concerns.  Thank you.    
Pharmacy Consult      Rose Marie Rivera is a 60 y.o. male for whom pharmacy has been consulted to dose Meropenem/linezolid.    Patient Active Problem List   Diagnosis    Acute respiratory failure with hypoxia (HCC)    Full code status    Former smoker    Nasal congestion    Anxiety    Mixed hyperlipidemia    Benign prostatic hyperplasia without lower urinary tract symptoms    Vertigo    APRYL (acute kidney injury)    Hyponatremia    Atrial fibrillation with rapid ventricular response (HCC)    NSTEMI (non-ST elevated myocardial infarction) ([facility])    Acid-base disorder, mixed    Community acquired pneumonia of right lung    Sepsis with acute hypoxic respiratory failure (HCC)    Leukocytosis    Prostate cancer (HCC)    CLL (chronic lymphocytic leukemia) (HCC)    Immunocompromised    Pneumonia of right lower lobe due to infectious organism       Allergies:  Patient has no known allergies.     Recent Labs     06/16/25  1040 06/16/25  1733   CREATININE 1.9* 2.2*       Ht/Wt:   Ht Readings from Last 1 Encounters:   06/16/25 1.676 m (5' 6\")        Wt Readings from Last 1 Encounters:   06/16/25 97.5 kg (215 lb)         Estimated Creatinine Clearance: 39 mL/min (A) (based on SCr of 2.2 mg/dL (H)).    Assessment/Plan:    Meropenem 1 gm q8h and linezolid 600 mg IV q12h    Thank you for the consult.  Will continue to follow.   Mor Garnica, PharmD, BCPS  6/16/2025  8:39 PM    
Physical Therapy        Physical Therapy Cancel Note      DATE: 2025    NAME: Rose Marie Rivera  MRN: [id number]   : 1965      Patient not seen this date for Physical Therapy due to:    Other: Pt on ventilation and not appropriate for mobility w/ therapy. Defer PT evaluation until pt able to tolerate mobility.      Electronically signed by Mine Ellis PT on 2025 at 11:23 AM      
RSI: (all vitals documented in flowsheet)    1515: patient in afib RVR, worsening dyspnea on BIPAP, hypotensive  1517: Dr. Conde at bedside  3236-4727: preparing for RSI; 2L NS bolus started  1521: 2mg versed  1522: , BP 72/56; 50mg propofol administered  1523: levo started at 10mcg/min  1524: 10mg propofol given  1527: 7.5 Indonesian ETT inserted to 25cm at teeth by Dr. Conde  1528: 10mg propofol given  1529: 200 joules cardioversion; converted to sinus tach rate 100, then immediately back to afib   1530: L nare NG inserted by Pat RN to 60cm with positive gastric contents  1534: levo increased to 15mcg/min  1535: levo increased to 17mcg/min  1538: levo increased to 20mcg/min  1542: levo increased to 30mcg/min  1545: vasopressin started at 0.02 units/min  1546: R fem arterial line inserted by Dr. Conde  1547: vaso increased to 0.04 units/min  1548: levo increased to 40mcg/min  1553: 3rd liter fluid bolus started  1554: 2mg versed  1555: 4th liter fluid bolus started  1557: ventilator rate increased to 30  1558: neosynephrine gtt started at 30mcg/min  1559: levo increased to 60mcg/min  1600: shawanda increased to 40mcg/min  1602: levo increased to 70mcg/min  1608: hydrocortisone 100mg given  1612: versed gtt started at 2mg/hr  1613: emergent echo done at bedside  1616: ventilator settings changed: rate 30, , PEEP 12, O2 100%  1620: EKG completed  1627: levo decreased to 60mcg/min  1632: levo decreased to 50mcg/min  1637: levo decreased to 40mcg/min  1713: amio bolus given  
SPIRITUAL CARE DEPARTMENT [facility]   Patient Death Note  DEATH                  Room # 337/337-01   Name: Rose Marie Rivera            Age: 60 y.o.  Gender: male          Confucianism: Non-Alevism      Place of Caodaism: unknown  Admit Date & Time: 2025 10:24 AM        Actual date of death:    TODay:          Referral:  Unit:   Room 337  Nurse:Vilma Alexandre     SITUATION AT DEATH:  (Relevant information to the situation that has led to death).     IS THIS A 'S CASE?  No    SPIRITUAL/EMOTIONAL INTERVENTION:  (Please note all relevant care assessments and interventions and duties performed. Also include important family names, numbers, and dynamics)       DOCTOR SIGNING DEATH CERTIFICATE:  Name:   Martínez Medley  Phone number: [phone]  Copy of COMPLETED Release of Body Form Received?  Yes    Patient's belongings: Family took all belongings.  did not touch anything     HOME:  Contacted Time 5:32pm  Name: [facility]: [place]  Phone Number: [phone]    NEXT OF KIN:  Name: Veena Findjose eduardo  Relationship: daughter  Street Address: [address] [place] [address]: [place]  State: [place]  Zip code: [address]   Phone Number: [phone]    ANY FOLLOW-UP NEEDED?  No    IF SO, WHAT?  No follow up needed  Electronically signed by Chaplain Konrad, on 2025 at 6:03 PM.  Spiritual Care Department  [facility]  [phone]   
Spiritual Health History and Assessment/Progress Note  [facility]     (P) Crisis, (P) Code Blue, Adjustment to illness,       Name: Daria Aguero MRN: [id number]    Age: 88 y.o.     Sex: female   Language: English   Caodaism: Mu-ism   Community acquired bacterial pneumonia      Date: 6/17/2025            Total Time Calculated: (P) 55 min               Spiritual Assessment continued in [facility]               Referral/Consult From: (P) Multi-disciplinary team      Encounter Overview/Reason: (P) Crisis  Service Provided For: (P) Patient and family together     Writer responded to overhead code blue. When writer entered pt's room pt was surrounded by multi-disciplnary team. Pt's son was seated on sofa in room. Writer provided a ministry of presence and emotional and spiritual support to patients son. Eventually patient's sister and \"girlfriend\" also arrived in patient's room. Pt's son, daughter and girlfriend continued to receive multiple medical reports from physicians and nurses. Writer continued to provide ministry of presence/support, and empathic listening.      Bessie, Belief, Meaning:   Patient identifies as spiritual and is connected with a bessie tradition or spiritual practice  Family/Friends identify as spiritual and are connected with a bessie tradition or spiritual practice      Importance and Influence:  Patient has spiritual/personal beliefs that influence decisions regarding their health  Family/Friends have spiritual/personal beliefs that influence decisions regarding the patient's health     Community:  Patient Other:    Family/Friends feel well-supported. Support system includes: Children and Friends     Assessment and Plan of Care:      Patient Interventions include: Other:    Family/Friends Interventions include: Facilitated expression of thoughts and feelings, Engaged in theological reflection, Affirmed coping skills/support systems, and Facilitated life review and/or 
Spiritual Health History and Assessment/Progress Note  [facility]    (P) Palliative Care, Spiritual/Emotional Needs,  , (P) Anticipatory Grief,      Name: Rose Marie Rivera MRN: [id number]    Age: 60 y.o.     Sex: male   Language: English   Catholic: Non-Congregational   Acute respiratory failure with hypoxia (HCC)     Date: 6/17/2025            Total Time Calculated: (P) 25 min              Spiritual Assessment continued in [facility]        Referral/Consult From: (P) Nurse   Encounter Overview/Reason: (P) Palliative Care, Spiritual/Emotional Needs  Service Provided For: (P) Family    Writer met w/ pt's family in pt's room during consultation with physician and palliative. Family received further medical report from doctor. Writer provided ministry of presence and comfort and continued to provide emotional and spiritual support to family after meeting with doctor and palliative care.     Bessie, Belief, Meaning:   Patient identifies as spiritual and is connected with a bessie tradition or spiritual practice  Family/Friends identify as spiritual and are connected with a bessie tradition or spiritual practice      Importance and Influence:  Patient has spiritual/personal beliefs that influence decisions regarding their health  Family/Friends have spiritual/personal beliefs that influence decisions regarding the patient's health    Community:  Patient Other:    Family/Friends feel well-supported. Support system includes: Extended family    Assessment and Plan of Care:     Patient Interventions include: Other:    Family/Friends Interventions include: Facilitated expression of thoughts and feelings, Explored spiritual coping/struggle/distress, Engaged in theological reflection, and Facilitated life review and/or legacy    Patient Plan of Care: Spiritual Care available upon further referral  Family/Friends Plan of Care: Spiritual Care available upon further referral    Electronically signed by Galindo Eugene 
enough to be transferred to tertiary center.  Due to his current hemodynamic instability, patient would not be a good candidate to initiate dialysis/CVVHD as well.  I did discuss the case in detail with patient's family, patient's son and his daughter both were present in the room.  Patient's nurse, charge nurse, primary resident physician along with spiritual care and palliative care nurse was also present in the room.  Patient's family verbalized understanding and are deciding about further goals of care and code status.  Will continue medical management until decision is made.  Will follow.  A great deal of time was spent in doing all of the above.  Nutrition   Please ensure that patient is on a renal diet/TF. Avoid nephrotoxic drugs/contrast exposure.    Trenton Braga MD  [facility]     This note is created with the assistance of a speech-recognition program. While intending to generate a document that actually reflects the content of the visit, no guarantees can be provided that every mistake has been identified and corrected by editing.

## 2025-06-19 LAB
MICROORGANISM SPEC CULT: NO GROWTH
MICROORGANISM/AGENT SPEC: NORMAL
SERVICE CMNT-IMP: NORMAL
SPECIMEN DESCRIPTION: NORMAL

## 2025-06-21 LAB
MICROORGANISM SPEC CULT: NORMAL
MICROORGANISM SPEC CULT: NORMAL
SERVICE CMNT-IMP: NORMAL
SERVICE CMNT-IMP: NORMAL
SPECIMEN DESCRIPTION: NORMAL
SPECIMEN DESCRIPTION: NORMAL

## 2025-06-23 LAB
MICROORGANISM SPEC CULT: NORMAL
MICROORGANISM/AGENT SPEC: NORMAL
SPECIMEN DESCRIPTION: NORMAL

## 2025-06-30 LAB
MICROORGANISM SPEC CULT: NORMAL
MICROORGANISM/AGENT SPEC: NORMAL
SPECIMEN DESCRIPTION: NORMAL

## 2025-07-05 LAB
MICROORGANISM SPEC CULT: ABNORMAL
MICROORGANISM/AGENT SPEC: ABNORMAL
SPECIMEN DESCRIPTION: ABNORMAL

## 2025-07-07 LAB
MICROORGANISM SPEC CULT: ABNORMAL
MICROORGANISM SPEC CULT: ABNORMAL
MICROORGANISM SPEC CULT: NORMAL
MICROORGANISM/AGENT SPEC: ABNORMAL
MICROORGANISM/AGENT SPEC: NORMAL
SPECIMEN DESCRIPTION: ABNORMAL
SPECIMEN DESCRIPTION: NORMAL

## 2025-07-15 LAB
MICROORGANISM SPEC CULT: NORMAL
MICROORGANISM/AGENT SPEC: NORMAL
SPECIMEN DESCRIPTION: NORMAL

## 2025-07-28 LAB
MICROORGANISM SPEC CULT: ABNORMAL
MICROORGANISM SPEC CULT: NORMAL
MICROORGANISM/AGENT SPEC: ABNORMAL
MICROORGANISM/AGENT SPEC: NORMAL
SPECIMEN DESCRIPTION: ABNORMAL
SPECIMEN DESCRIPTION: NORMAL

## 2025-08-04 LAB
MICROORGANISM SPEC CULT: NORMAL
MICROORGANISM/AGENT SPEC: NORMAL
SPECIMEN DESCRIPTION: NORMAL

## 2025-08-07 LAB
FUNGUS IDENTIFIED: NORMAL
ZZ NTE WITH NAME CLEAN UP: SPECIMEN SOURCE: NORMAL

## 2025-08-08 LAB
MICROORGANISM SPEC CULT: ABNORMAL
MICROORGANISM/AGENT SPEC: ABNORMAL
SPECIMEN DESCRIPTION: ABNORMAL